# Patient Record
Sex: FEMALE | Race: WHITE | NOT HISPANIC OR LATINO | Employment: FULL TIME | URBAN - METROPOLITAN AREA
[De-identification: names, ages, dates, MRNs, and addresses within clinical notes are randomized per-mention and may not be internally consistent; named-entity substitution may affect disease eponyms.]

---

## 2018-01-30 ENCOUNTER — OFFICE VISIT CONVERTED (OUTPATIENT)
Dept: INTERNAL MEDICINE | Facility: CLINIC | Age: 38
End: 2018-01-30
Attending: INTERNAL MEDICINE

## 2018-03-22 ENCOUNTER — OFFICE VISIT CONVERTED (OUTPATIENT)
Dept: PLASTIC SURGERY | Facility: CLINIC | Age: 38
End: 2018-03-22
Attending: PLASTIC SURGERY

## 2018-03-22 ENCOUNTER — CONVERSION ENCOUNTER (OUTPATIENT)
Dept: OTHER | Facility: HOSPITAL | Age: 38
End: 2018-03-22

## 2018-06-04 ENCOUNTER — CONVERSION ENCOUNTER (OUTPATIENT)
Dept: INTERNAL MEDICINE | Facility: CLINIC | Age: 38
End: 2018-06-04

## 2018-06-04 ENCOUNTER — OFFICE VISIT CONVERTED (OUTPATIENT)
Dept: INTERNAL MEDICINE | Facility: CLINIC | Age: 38
End: 2018-06-04
Attending: INTERNAL MEDICINE

## 2018-07-17 ENCOUNTER — OFFICE VISIT CONVERTED (OUTPATIENT)
Dept: INTERNAL MEDICINE | Facility: CLINIC | Age: 38
End: 2018-07-17
Attending: INTERNAL MEDICINE

## 2018-09-13 ENCOUNTER — CONVERSION ENCOUNTER (OUTPATIENT)
Dept: INTERNAL MEDICINE | Facility: CLINIC | Age: 38
End: 2018-09-13

## 2018-09-13 ENCOUNTER — OFFICE VISIT CONVERTED (OUTPATIENT)
Dept: INTERNAL MEDICINE | Facility: CLINIC | Age: 38
End: 2018-09-13
Attending: INTERNAL MEDICINE

## 2018-12-19 ENCOUNTER — CONVERSION ENCOUNTER (OUTPATIENT)
Dept: MAMMOGRAPHY | Facility: HOSPITAL | Age: 38
End: 2018-12-19

## 2019-05-14 ENCOUNTER — OFFICE VISIT CONVERTED (OUTPATIENT)
Dept: INTERNAL MEDICINE | Facility: CLINIC | Age: 39
End: 2019-05-14
Attending: INTERNAL MEDICINE

## 2019-06-07 ENCOUNTER — HOSPITAL ENCOUNTER (OUTPATIENT)
Dept: LAB | Facility: HOSPITAL | Age: 39
Discharge: HOME | End: 2019-06-07

## 2019-06-07 LAB
ALBUMIN SERPL-MCNC: 4.3 G/DL (ref 3.5–5)
ALBUMIN/GLOB SERPL: 1.4 {RATIO} (ref 1.4–2.6)
ALP SERPL-CCNC: 89 U/L (ref 42–98)
ALT SERPL-CCNC: 8 U/L (ref 10–40)
ANION GAP SERPL CALC-SCNC: 18 MMOL/L (ref 8–19)
AST SERPL-CCNC: 19 U/L (ref 15–50)
BASOPHILS # BLD AUTO: 0.04 10*3/UL (ref 0–0.2)
BASOPHILS NFR BLD AUTO: 0.9 % (ref 0–3)
BILIRUB SERPL-MCNC: 0.6 MG/DL (ref 0.2–1.3)
BUN SERPL-MCNC: 9 MG/DL (ref 5–25)
BUN/CREAT SERPL: 13 {RATIO} (ref 6–20)
CALCIUM SERPL-MCNC: 9 MG/DL (ref 8.7–10.4)
CHLORIDE SERPL-SCNC: 103 MMOL/L (ref 99–111)
CHOLEST SERPL-MCNC: 165 MG/DL (ref 107–200)
CHOLEST/HDLC SERPL: 2.3 {RATIO} (ref 3–6)
CONV ABS IMM GRAN: 0.01 10*3/UL (ref 0–0.2)
CONV CO2: 24 MMOL/L (ref 22–32)
CONV IMMATURE GRAN: 0.2 % (ref 0–1.8)
CONV TOTAL PROTEIN: 7.4 G/DL (ref 6.3–8.2)
CREAT UR-MCNC: 0.71 MG/DL (ref 0.5–0.9)
DEPRECATED RDW RBC AUTO: 43.5 FL (ref 36.4–46.3)
EOSINOPHIL # BLD AUTO: 0.05 10*3/UL (ref 0–0.7)
EOSINOPHIL # BLD AUTO: 1.1 % (ref 0–7)
ERYTHROCYTE [DISTWIDTH] IN BLOOD BY AUTOMATED COUNT: 12.8 % (ref 11.7–14.4)
GFR SERPLBLD BASED ON 1.73 SQ M-ARVRAT: >60 ML/MIN/{1.73_M2}
GLOBULIN UR ELPH-MCNC: 3.1 G/DL (ref 2–3.5)
GLUCOSE SERPL-MCNC: 82 MG/DL (ref 65–99)
HBA1C MFR BLD: 12.6 G/DL (ref 12–16)
HCT VFR BLD AUTO: 37.6 % (ref 37–47)
HDLC SERPL-MCNC: 72 MG/DL (ref 40–60)
LDLC SERPL CALC-MCNC: 79 MG/DL (ref 70–100)
LYMPHOCYTES # BLD AUTO: 1.56 10*3/UL (ref 1–5)
MCH RBC QN AUTO: 31 PG (ref 27–31)
MCHC RBC AUTO-ENTMCNC: 33.5 G/DL (ref 33–37)
MCV RBC AUTO: 92.6 FL (ref 81–99)
MONOCYTES # BLD AUTO: 0.3 10*3/UL (ref 0.2–1.2)
MONOCYTES NFR BLD AUTO: 6.5 % (ref 3–10)
NEUTROPHILS # BLD AUTO: 2.63 10*3/UL (ref 2–8)
NEUTROPHILS NFR BLD AUTO: 57.3 % (ref 30–85)
NRBC CBCN: 0 % (ref 0–0.7)
OSMOLALITY SERPL CALC.SUM OF ELEC: 290 MOSM/KG (ref 273–304)
PLATELET # BLD AUTO: 239 10*3/UL (ref 130–400)
PMV BLD AUTO: 10.7 FL (ref 9.4–12.3)
POTASSIUM SERPL-SCNC: 3.9 MMOL/L (ref 3.5–5.3)
RBC # BLD AUTO: 4.06 10*6/UL (ref 4.2–5.4)
SODIUM SERPL-SCNC: 141 MMOL/L (ref 135–147)
TRIGL SERPL-MCNC: 68 MG/DL (ref 40–150)
TSH SERPL-ACNC: 1.5 M[IU]/L (ref 0.27–4.2)
VARIANT LYMPHS NFR BLD MANUAL: 34 % (ref 20–45)
VLDLC SERPL-MCNC: 14 MG/DL (ref 5–37)
WBC # BLD AUTO: 4.59 10*3/UL (ref 4.8–10.8)

## 2019-11-21 ENCOUNTER — OFFICE VISIT CONVERTED (OUTPATIENT)
Dept: INTERNAL MEDICINE | Facility: CLINIC | Age: 39
End: 2019-11-21
Attending: INTERNAL MEDICINE

## 2020-01-16 ENCOUNTER — HOSPITAL ENCOUNTER (OUTPATIENT)
Dept: MAMMOGRAPHY | Facility: HOSPITAL | Age: 40
Discharge: HOME | End: 2020-01-16
Attending: INTERNAL MEDICINE

## 2020-05-21 ENCOUNTER — TELEMEDICINE CONVERTED (OUTPATIENT)
Dept: INTERNAL MEDICINE | Facility: CLINIC | Age: 40
End: 2020-05-21
Attending: INTERNAL MEDICINE

## 2020-06-18 ENCOUNTER — HOSPITAL ENCOUNTER (OUTPATIENT)
Dept: LAB | Facility: HOSPITAL | Age: 40
Discharge: HOME | End: 2020-06-18

## 2020-06-18 LAB
ALBUMIN SERPL-MCNC: 4.4 G/DL (ref 3.5–5)
ALBUMIN/GLOB SERPL: 1.5 {RATIO} (ref 1.4–2.6)
ALP SERPL-CCNC: 79 U/L (ref 42–98)
ALT SERPL-CCNC: 6 U/L (ref 10–40)
ANION GAP SERPL CALC-SCNC: 16 MMOL/L (ref 8–19)
AST SERPL-CCNC: 18 U/L (ref 15–50)
BASOPHILS # BLD AUTO: 0.04 10*3/UL (ref 0–0.2)
BASOPHILS NFR BLD AUTO: 0.8 % (ref 0–3)
BILIRUB SERPL-MCNC: 0.55 MG/DL (ref 0.2–1.3)
BUN SERPL-MCNC: 13 MG/DL (ref 5–25)
BUN/CREAT SERPL: 21 {RATIO} (ref 6–20)
CALCIUM SERPL-MCNC: 9.2 MG/DL (ref 8.7–10.4)
CHLORIDE SERPL-SCNC: 99 MMOL/L (ref 99–111)
CHOLEST SERPL-MCNC: 142 MG/DL (ref 107–200)
CHOLEST/HDLC SERPL: 2.3 {RATIO} (ref 3–6)
CONV ABS IMM GRAN: 0.01 10*3/UL (ref 0–0.2)
CONV CO2: 23 MMOL/L (ref 22–32)
CONV IMMATURE GRAN: 0.2 % (ref 0–1.8)
CONV TOTAL PROTEIN: 7.3 G/DL (ref 6.3–8.2)
CREAT UR-MCNC: 0.63 MG/DL (ref 0.5–0.9)
DEPRECATED RDW RBC AUTO: 43.4 FL (ref 36.4–46.3)
EOSINOPHIL # BLD AUTO: 0.08 10*3/UL (ref 0–0.7)
EOSINOPHIL # BLD AUTO: 1.7 % (ref 0–7)
ERYTHROCYTE [DISTWIDTH] IN BLOOD BY AUTOMATED COUNT: 12.7 % (ref 11.7–14.4)
GFR SERPLBLD BASED ON 1.73 SQ M-ARVRAT: >60 ML/MIN/{1.73_M2}
GLOBULIN UR ELPH-MCNC: 2.9 G/DL (ref 2–3.5)
GLUCOSE SERPL-MCNC: 83 MG/DL (ref 65–99)
HCT VFR BLD AUTO: 39.1 % (ref 37–47)
HDLC SERPL-MCNC: 61 MG/DL (ref 40–60)
HGB BLD-MCNC: 13 G/DL (ref 12–16)
LDLC SERPL CALC-MCNC: 68 MG/DL (ref 70–100)
LYMPHOCYTES # BLD AUTO: 1.59 10*3/UL (ref 1–5)
LYMPHOCYTES NFR BLD AUTO: 32.9 % (ref 20–45)
MCH RBC QN AUTO: 31 PG (ref 27–31)
MCHC RBC AUTO-ENTMCNC: 33.2 G/DL (ref 33–37)
MCV RBC AUTO: 93.1 FL (ref 81–99)
MONOCYTES # BLD AUTO: 0.32 10*3/UL (ref 0.2–1.2)
MONOCYTES NFR BLD AUTO: 6.6 % (ref 3–10)
NEUTROPHILS # BLD AUTO: 2.8 10*3/UL (ref 2–8)
NEUTROPHILS NFR BLD AUTO: 57.8 % (ref 30–85)
NRBC CBCN: 0 % (ref 0–0.7)
OSMOLALITY SERPL CALC.SUM OF ELEC: 277 MOSM/KG (ref 273–304)
PLATELET # BLD AUTO: 257 10*3/UL (ref 130–400)
PMV BLD AUTO: 10.3 FL (ref 9.4–12.3)
POTASSIUM SERPL-SCNC: 4.2 MMOL/L (ref 3.5–5.3)
RBC # BLD AUTO: 4.2 10*6/UL (ref 4.2–5.4)
SODIUM SERPL-SCNC: 134 MMOL/L (ref 135–147)
TRIGL SERPL-MCNC: 66 MG/DL (ref 40–150)
TSH SERPL-ACNC: 1 M[IU]/L (ref 0.27–4.2)
VLDLC SERPL-MCNC: 13 MG/DL (ref 5–37)
WBC # BLD AUTO: 4.84 10*3/UL (ref 4.8–10.8)

## 2020-07-02 ENCOUNTER — TELEMEDICINE CONVERTED (OUTPATIENT)
Dept: INTERNAL MEDICINE | Facility: CLINIC | Age: 40
End: 2020-07-02
Attending: NURSE PRACTITIONER

## 2020-08-03 ENCOUNTER — HOSPITAL ENCOUNTER (OUTPATIENT)
Dept: GENERAL RADIOLOGY | Facility: HOSPITAL | Age: 40
Discharge: HOME | End: 2020-08-03
Attending: INTERNAL MEDICINE

## 2020-09-28 ENCOUNTER — TELEMEDICINE CONVERTED (OUTPATIENT)
Dept: INTERNAL MEDICINE | Facility: CLINIC | Age: 40
End: 2020-09-28
Attending: INTERNAL MEDICINE

## 2020-12-30 ENCOUNTER — HOSPITAL ENCOUNTER (OUTPATIENT)
Dept: OTHER | Facility: HOSPITAL | Age: 40
Discharge: HOME | End: 2020-12-30
Attending: INTERNAL MEDICINE

## 2021-01-29 ENCOUNTER — HOSPITAL ENCOUNTER (OUTPATIENT)
Dept: OTHER | Facility: HOSPITAL | Age: 41
Discharge: HOME | End: 2021-01-29
Attending: INTERNAL MEDICINE

## 2021-05-03 ENCOUNTER — HOSPITAL ENCOUNTER (OUTPATIENT)
Dept: MAMMOGRAPHY | Facility: HOSPITAL | Age: 41
Discharge: HOME | End: 2021-05-03
Attending: INTERNAL MEDICINE

## 2021-05-13 NOTE — PROGRESS NOTES
"   Progress Note      Patient Name: Violeta Gaona   Patient ID: [id number]   Sex: Female   YOB: 1980    Primary Care Provider: Radha Finnegan MD    Visit Date: May 21, 2020    Provider: Radha Finnegan MD   Location: [facility]   Location Address: [address] [place] [address], [address]  [place], [place]  [id number]   Location Phone: [phone]          Chief Complaint  · \"6 month follow up\"      History Of Present Illness  Video Conferencing Visit  Violeta Gaona is a 39 year old /White female who is presenting for evaluation via video conferencing via zoom. Verbal consent obtained before beginning visit.   The following staff were present during this visit: none   Violeta Gaona is a 39 year old /White female who presents for evaluation and treatment of:      chronic issues    ramirez estevan other than COVID    feels like anxiety meds are significantly helping her     no chest pain  no trouble breathing  no leg swelling    she is curious if she could try a medication to help with vaginal dryness  she has some dysparunia       Past Medical History  Disease Name Date Onset Notes   Artificial menopause state 12/16/2014 --    Broken Bones 1997 --    Hemorrhoids --  --    Night sweats --  --    Reflux Disease --  --    Screening Mammogram 01/16/19 --          Past Surgical History  Procedure Name Date Notes   Exploratory Laprotomy 2004 Adhesions   Hysterectomy 2004 PCDD (Poly-cystic Overies)         Medication List  Name Date Started Instructions   Premarin 0.9 mg oral tablet 05/21/2020 take 1 tablet (0.9 mg) by oral route once daily   Wellbutrin  mg oral tablet sustained-release 12 hr 05/21/2020 take 1 tablet (150 mg) by oral route once daily         Allergy List  Allergen Name Date Reaction Notes   Lexapro 7/2018 Hives --        Allergies Reconciled  Family Medical History  Disease Name Relative/Age Notes   Heart Disease Grandfather (maternal)/  Grandmother (maternal)/   --  "   Diabetes Grandfather (maternal)/  Grandmother (maternal)/   --          Reproductive History  Menstrual   Certainty of LMP Date:  Menopause Status: Postmenopausal Age Menopause: 24   Pregnancy Summary   Total Pregnancies: 0 Full Term: 0 Premature: 0   Ab Induced: 0 Ab Spontaneous: 0 Ectopics: 0   Multiples: 0 Livin         Social History  Finding Status Start/Stop Quantity Notes   Alcohol Never --/-- --  --    Tobacco Never --/-- --  --          Immunizations  NameDate Admin Mfg Trade Name Lot Number Route Inj VIS Given VIS Publication   Hepatitis A02018 SKB HAVRIX-ADULT [id number] IM LD 2018   Comments: Pt tolerated well, left office in stable condition   Eoicxxpyv13/13/2018 PMC Fluzone Quadrivalent [id number] IM LA 2018   Comments: Pt tolerated well and left office in stable condition   Tdap2018 SKB BOOSTRIX 5H2H2 IM LD 2018   Comments: pt tolerated well and left office in stable condition, CHEPE Iqbal         Review of Systems  · Constitutional  o Denies  o : fever, fatigue, weight loss, weight gain  · Cardiovascular  o Denies  o : lower extremity edema, claudication, chest pressure, palpitations  · Respiratory  o Denies  o : shortness of breath, wheezing, frequent cough, hemoptysis, dyspnea on exertion  · Gastrointestinal  o Denies  o : nausea, vomiting, diarrhea, constipation, abdominal pain      Physical Examination                Assessment  · Artificial menopause state     627.4/N95.8  will adjust medications  will refer back to gyn for further adjust meds of meds  · Post menopausal syndrome     V49.81/Z78.0  · Anxiety disorder     300.00/F41.9  doing great cont current meds  · Vitamin D deficiency     268.9/E55.9    Problems Reconciled  Plan  · Orders  o DEXA Bone Density, 1 or more sites, axial skeleton HMH (30867) - V49.81/Z78.0 - 2020  o Vitamin D Level (35986) - 268.9/E55.9 - 2020  o ACO-39: Current medications updated and  reviewed () - - 05/21/2020  o OB/GYN CONSULTATION (OBGYN) - 627.4/N95.8, V49.81/Z78.0 - 05/21/2020   to help with post hysterectomy med management; has seen preen in the past  · Medications  o Premarin 0.625 mg/gram vaginal cream   SIG: insert one-fourth applicatorful (0.5 gram) by vaginal route twice weekly   DISP: (1) 30 gm tube/kit with 0 refills  Prescribed on 05/21/2020     o Premarin 0.9 mg oral tablet   SIG: take 1 tablet (0.9 mg) by oral route once daily   DISP: (90) Tablet with 0 refills  Refilled on 05/21/2020     o Wellbutrin  mg oral tablet sustained-release 12 hr   SIG: take 1 tablet (150 mg) by oral route once daily   DISP: (90) Tablet with 0 refills  Refilled on 05/21/2020     o Medications have been Reconciled  o Transition of Care or Provider Policy  · Instructions  o Stop taking calcium supplements for at least 48 hours prior to your exam. Failure to stop supplements could alter results, and the radiologists will require you to reschedule your test.  o Patient was educated/instructed on their diagnosis, treatment and medications prior to discharge from the clinic today.            Electronically Signed by: Radha Finnegan MD -Author on May 25, 2020 05:50:07 PM

## 2021-05-13 NOTE — PROGRESS NOTES
Progress Note      Patient Name: Violeta Gaona   Patient ID: [id number]   Sex: Female   YOB: 1980    Primary Care Provider: Radha Finnegan MD    Visit Date: July 2, 2020    Provider: TIFFANY Seaman   Location: [facility]   Location Address: [address] [place] [address], [address]  [place], [place]  [id number]   Location Phone: [phone]          Chief Complaint  · Possible shingles   · Rash       History Of Present Illness  Video Conferencing Visit  Violeta Gaona is a 40 year old /White female who is presenting for evaluation via video conferencing via Zoom. Verbal consent obtained before beginning visit.   The following staff were present during this visit: Debbi Baptiste MA; TIFFANY Seaman      Informed patient that as visit is being performed as a video conference there will be no opportunity to obtain vital signs or perform a thorough physical exam. Due to this there is unfortunately a possibility that things may be missed that would typically be noticed during a traditional visit. Patient is aware of this possibility and agrees to proceed with the video conference. Patient states there is no other person present for this video conference. Call via Zoom.    Patient reports last night she started to feel a tingling sensation on her left side, this morning when she woke up the tingly feeling has been replaced by a severe burning/tingling pain and raised erythematous rash. Patient states site it very tender to touch. Denies fever, chills, warmth, streaking, itching. Patient reports chickenpox at the age of 4. She has not yet taken anything for pain. Patient is not immunocompromised.       Past Medical History  Disease Name Date Onset Notes   Artificial menopause state 12/16/2014 --    Broken Bones 1997 --    Hemorrhoids --  --    Night sweats --  --    Reflux Disease --  --    Screening Mammogram 01/16/19 --          Past Surgical History  Procedure Name Date Notes    Exploratory Laprotomy  Adhesions   Hysterectomy  PCDD (Poly-cystic Overies)         Medication List  Name Date Started Instructions   Premarin 0.625 mg/gram vaginal cream 2020 insert one-fourth applicatorful (0.5 gram) by vaginal route twice weekly   Premarin 0.9 mg oral tablet 2020 take 1 tablet (0.9 mg) by oral route once daily   Wellbutrin  mg oral tablet sustained-release 12 hr 2020 take 1 tablet (150 mg) by oral route once daily         Allergy List  Allergen Name Date Reaction Notes   Lexapro 2018 Hives --        Allergies Reconciled  Family Medical History  Disease Name Relative/Age Notes   Heart Disease Grandfather (maternal)/  Grandmother (maternal)/   --    Diabetes Grandfather (maternal)/  Grandmother (maternal)/   --          Reproductive History  Menstrual   Certainty of LMP Date:  Menopause Status: Postmenopausal Age Menopause: 24   Pregnancy Summary   Total Pregnancies: 0 Full Term: 0 Premature: 0   Ab Induced: 0 Ab Spontaneous: 0 Ectopics: 0   Multiples: 0 Livin         Social History  Finding Status Start/Stop Quantity Notes   Alcohol Never --/-- --  --    Tobacco Never --/-- --  --          Immunizations  NameDate Admin Mfg Trade Name Lot Number Route Inj VIS Given VIS Publication   Hepatitis A02018 SKB HAVRIX-ADULT [id number] IM LD 2018   Comments: Pt tolerated well, left office in stable condition   Jgvktwqzq06/13/2018 [facility] Fluzone Quadrivalent [id number] IM LA 2018   Comments: Pt tolerated well and left office in stable condition   Tdap2018 SKB BOOSTRIX 5H2H2 IM LD 2018   Comments: pt tolerated well and left office in stable condition, CHEPE Iqbal         Review of Systems  · Constitutional  o Denies  o : fever, fatigue, weight loss, weight gain  · Cardiovascular  o Denies  o : lower extremity edema, chest pressure, palpitations  · Respiratory  o Denies  o : shortness of breath, wheezing, cough,  dyspnea on exertion  · Gastrointestinal  o Denies  o : nausea, vomiting, diarrhea, constipation, abdominal pain  · Integument  o Admits  o : rash, new skin lesions  o Denies  o : itching      Physical Examination     General: Well nourished, no acute distress  HENT: Atraumatic, normocephalic  Eyes: Extraocular movements intact, no scleral icterus  Lungs: Breathing comfortably, without cough  Integumentary: Raised, erythematous rash  Neurologic: Grossly oriented to person, place, time; without facial droop  Psych: Normal mood and affect               Assessment  · Herpes zoster     053.9/B02.9  Left flank, symptoms less than 72 hours. Will treat with valacyclovir at this time. Tylenol/Motrin for pain relief. Avoid exposure to immunocompromised patients, those who have not had the chickenpox or chickenpox vaccine, including infants less than 12 months. Discussed course of illness/rash, monitor for secondary bacterial infection. Keep area clean and dry. Patient will call or return to clinic with concerns, aware of 24 hour on call service in the event that there are concerns outside of office hours.    Problems Reconciled  Plan  · Orders  o ACO-39: Current medications updated and reviewed () - - 07/02/2020  · Medications  o valacyclovir 1 gram oral tablet   SIG: take 1 tablet (1,000 mg) by oral route 3 times per day for 7 days   DISP: (21) tablets with 0 refills  Prescribed on 07/02/2020     o Medications have been Reconciled  o Transition of Care or Provider Policy  · Instructions  o Take all medications as prescribed/directed.  o Patient was educated/instructed on their diagnosis, treatment and medications prior to discharge from the clinic today.  o Patient instructed to seek medical attention urgently for new or worsening symptoms.  o Call the office with any concerns or questions.  · Disposition  o Call or Return if symptoms worsen or persist.  o Prescriptions sent to pharmacy            Electronically Signed  by: TIFFANY Seaman -Author on July 2, 2020 02:26:46 PM

## 2021-05-13 NOTE — PROGRESS NOTES
"   Progress Note      Patient Name: Violeta Gaona   Patient ID: [id number]   Sex: Female   YOB: 1980    Primary Care Provider: Radha Finnegan MD    Visit Date: September 28, 2020    Provider: Radha Finnegan MD   Location: [facility]   Location Address: [address] [place] [address], [address]  [place], [place]  [id number]   Location Phone: [phone]          Chief Complaint  · follow up  · \"follow from Dr. Lewis\"      History Of Present Illness  Video Conferencing Visit  Violeta Gaona is a 40 year old /White female who is presenting for evaluation via video conferencing via zoom. Verbal consent obtained before beginning visit.   The following staff were present during this visit: none   Violeta Gaona is a 40 year old /White female who presents for evaluation and treatment of:      Issues.    Overall doing well    Stress medicines working significantly well for her  Feels like things are much better than they had been previously  Now in a different job which is much better for her    No chest pain  No trouble breathing  No leg swelling    Vagifem working well for her       Past Medical History  Disease Name Date Onset Notes   Artificial menopause state 12/16/2014 --    Broken Bones 1997 --    Hemorrhoids --  --    Night sweats --  --    Reflux Disease --  --    Screening Mammogram 01/16/19 --          Past Surgical History  Procedure Name Date Notes   Exploratory Laprotomy 2004 Adhesions   Hysterectomy 2004 PCDD (Poly-cystic Overies)         Medication List  Name Date Started Instructions   Calcium 600 600 mg calcium (1,500 mg) oral tablet  take 2 tablets by oral route daily   Premarin 0.625 mg/gram vaginal cream 05/21/2020 insert one-fourth applicatorful (0.5 gram) by vaginal route twice weekly   Vagifem 10 mcg vaginal tablet  insert 1 tablet (10 mcg) by vaginal route twice weekly   Wellbutrin  mg oral tablet sustained-release 12 hr 07/21/2020 Take 1 tablet by mouth " once daily         Allergy List  Allergen Name Date Reaction Notes   Lexapro 2018 Hives --          Family Medical History  Disease Name Relative/Age Notes   Heart Disease Grandfather (maternal)/  Grandmother (maternal)/   --    Diabetes Grandfather (maternal)/  Grandmother (maternal)/   --          Reproductive History  Menstrual   Certainty of LMP Date:  Menopause Status: Postmenopausal Age Menopause: 24   Pregnancy Summary   Total Pregnancies: 0 Full Term: 0 Premature: 0   Ab Induced: 0 Ab Spontaneous: 0 Ectopics: 0   Multiples: 0 Livin         Social History  Finding Status Start/Stop Quantity Notes   Alcohol Never --/-- --  --    Tobacco Never --/-- --  --          Immunizations  NameDate Admin Mfg Trade Name Lot Number Route Inj VIS Given VIS Publication   Hepatitis A02018 SKB HAVRIX-ADULT [id number] IM LD 2018   Comments: Pt tolerated well, left office in stable condition   Zmhnlrshy28/13/2018 PMC Fluzone Quadrivalent [id number] IM LA 2018   Comments: Pt tolerated well and left office in stable condition   Tdap2018 SKB BOOSTRIX 5H2H2 IM LD 2018   Comments: pt tolerated well and left office in stable condition, CHEPE Iqbal         Review of Systems  · Constitutional  o Denies  o : fever, fatigue, weight loss, weight gain  · Cardiovascular  o Denies  o : lower extremity edema, claudication, chest pressure, palpitations  · Respiratory  o Denies  o : shortness of breath, wheezing, frequent cough, hemoptysis, dyspnea on exertion  · Gastrointestinal  o Denies  o : nausea, vomiting, diarrhea, constipation, abdominal pain      Physical Examination     Gen: well-nourished, no acute distress  HENT: atraumatic, normocephalic  Eyes: extraocular movements intact, no scleral icterus  Lung: breathing comfortably, no cough  Skin: no visible rash, no lesions  Neuro: grossly oriented to person, place, and time. no facial droop   Psych: normal mood and affect              Assessment  · Artificial menopause state     627.4/N95.8  Well on Vagifem continue for now  · Anxiety disorder     300.00/F41.9  Well-controlled continue current medications    Problems Reconciled  Plan  · Orders  o ACO-39: Current medications updated and reviewed (1159F, ) - - 09/28/2020  · Medications  o Medications have been Reconciled  o Transition of Care or Provider Policy  · Instructions  o Patient was educated/instructed on their diagnosis, treatment and medications prior to discharge from the clinic today.            Electronically Signed by: Radha Finnegan MD -Author on October 11, 2020 10:37:44 PM

## 2021-05-15 VITALS
TEMPERATURE: 98.3 F | WEIGHT: 133.12 LBS | HEART RATE: 75 BPM | DIASTOLIC BLOOD PRESSURE: 68 MMHG | HEIGHT: 65 IN | BODY MASS INDEX: 22.18 KG/M2 | SYSTOLIC BLOOD PRESSURE: 102 MMHG | OXYGEN SATURATION: 99 %

## 2021-05-15 VITALS
HEART RATE: 86 BPM | HEIGHT: 65 IN | DIASTOLIC BLOOD PRESSURE: 78 MMHG | BODY MASS INDEX: 22.82 KG/M2 | OXYGEN SATURATION: 99 % | RESPIRATION RATE: 16 BRPM | TEMPERATURE: 97.9 F | WEIGHT: 137 LBS | SYSTOLIC BLOOD PRESSURE: 122 MMHG

## 2021-05-16 VITALS
OXYGEN SATURATION: 99 % | RESPIRATION RATE: 16 BRPM | WEIGHT: 138.12 LBS | DIASTOLIC BLOOD PRESSURE: 70 MMHG | HEIGHT: 65 IN | TEMPERATURE: 98.1 F | HEART RATE: 69 BPM | BODY MASS INDEX: 23.01 KG/M2 | SYSTOLIC BLOOD PRESSURE: 120 MMHG

## 2021-05-16 VITALS
SYSTOLIC BLOOD PRESSURE: 120 MMHG | WEIGHT: 138.25 LBS | OXYGEN SATURATION: 100 % | RESPIRATION RATE: 16 BRPM | HEART RATE: 88 BPM | HEIGHT: 65 IN | BODY MASS INDEX: 23.03 KG/M2 | DIASTOLIC BLOOD PRESSURE: 80 MMHG

## 2021-05-16 VITALS
HEART RATE: 76 BPM | BODY MASS INDEX: 22.85 KG/M2 | SYSTOLIC BLOOD PRESSURE: 104 MMHG | TEMPERATURE: 97.3 F | WEIGHT: 137.12 LBS | RESPIRATION RATE: 16 BRPM | OXYGEN SATURATION: 99 % | DIASTOLIC BLOOD PRESSURE: 80 MMHG | HEIGHT: 65 IN

## 2021-05-16 VITALS
HEART RATE: 70 BPM | HEIGHT: 65 IN | WEIGHT: 136.37 LBS | BODY MASS INDEX: 22.72 KG/M2 | RESPIRATION RATE: 16 BRPM | OXYGEN SATURATION: 100 % | TEMPERATURE: 97 F | SYSTOLIC BLOOD PRESSURE: 122 MMHG | DIASTOLIC BLOOD PRESSURE: 82 MMHG

## 2021-06-08 VITALS
HEART RATE: 69 BPM | OXYGEN SATURATION: 99 % | SYSTOLIC BLOOD PRESSURE: 116 MMHG | HEIGHT: 65 IN | DIASTOLIC BLOOD PRESSURE: 80 MMHG | WEIGHT: 138 LBS | BODY MASS INDEX: 22.99 KG/M2

## 2021-06-21 ENCOUNTER — TELEMEDICINE (OUTPATIENT)
Dept: INTERNAL MEDICINE | Facility: CLINIC | Age: 41
End: 2021-06-21

## 2021-06-21 DIAGNOSIS — F33.1 MAJOR DEPRESSIVE DISORDER, RECURRENT, MODERATE (HCC): ICD-10-CM

## 2021-06-21 DIAGNOSIS — F41.9 ANXIETY: Primary | ICD-10-CM

## 2021-06-21 PROBLEM — K21.9 ESOPHAGEAL REFLUX: Status: ACTIVE | Noted: 2021-06-21

## 2021-06-21 PROCEDURE — 99214 OFFICE O/P EST MOD 30 MIN: CPT | Performed by: INTERNAL MEDICINE

## 2021-06-21 RX ORDER — BUPROPION HYDROCHLORIDE 100 MG/1
TABLET, EXTENDED RELEASE ORAL
COMMUNITY
End: 2021-06-21 | Stop reason: SDUPTHER

## 2021-06-21 RX ORDER — ESTRADIOL 10 UG/1
INSERT VAGINAL
COMMUNITY
End: 2022-01-07 | Stop reason: SDUPTHER

## 2021-06-21 RX ORDER — BUPROPION HYDROCHLORIDE 150 MG/1
150 TABLET, EXTENDED RELEASE ORAL DAILY
Qty: 90 TABLET | Refills: 0 | Status: SHIPPED | OUTPATIENT
Start: 2021-06-21 | End: 2021-06-21 | Stop reason: SDUPTHER

## 2021-06-21 RX ORDER — CONJUGATED ESTROGENS 0.62 MG/1
TABLET, FILM COATED ORAL
COMMUNITY
Start: 2021-05-20 | End: 2021-08-31

## 2021-06-21 RX ORDER — BUPROPION HYDROCHLORIDE 150 MG/1
150 TABLET, EXTENDED RELEASE ORAL DAILY
Qty: 90 TABLET | Refills: 0 | Status: SHIPPED | OUTPATIENT
Start: 2021-06-21 | End: 2021-09-01 | Stop reason: SDUPTHER

## 2021-06-21 NOTE — PROGRESS NOTES
Chief Complaint  Anxiety    Subjective          Violeta Gaona presents to [facility]  History of Present Illness  Understands this is a video telehealth visit and has given consent to treatment of this type of visit, knowing she may need to come in for further evaluation      Unfortunately just lost her brother  anxiety and depression are worsening due to dealing with this    No chest pain  No trouble breathing      Objective   Vital Signs:   There were no vitals taken for this visit.    Physical Exam   Visit done by teleheatlh  Gen: well appearing and alert  Eye: EOMI, PEERL  CV: non diaphoretic  Psych: does appear anxious and sad   Result Review :          Procedures      Assessment and Plan    Diagnoses and all orders for this visit:    1. Anxiety (Primary)    2. Major depressive disorder, recurrent, moderate (CMS/HCC)  Comments:  will increase meds  discussed risks and benefits    Other orders  -     Discontinue: buPROPion SR (Wellbutrin SR) 150 MG 12 hr tablet; Take 1 tablet by mouth Daily.  Dispense: 90 tablet; Refill: 0  -     buPROPion SR (Wellbutrin SR) 150 MG 12 hr tablet; Take 1 tablet by mouth Daily.  Dispense: 90 tablet; Refill: 0        Follow Up   No follow-ups on file.  Patient was given instructions and counseling regarding her condition or for health maintenance advice. Please see specific information pulled into the AVS if appropriate.

## 2021-07-09 ENCOUNTER — LAB (OUTPATIENT)
Dept: LAB | Facility: HOSPITAL | Age: 41
End: 2021-07-09

## 2021-07-09 ENCOUNTER — TRANSCRIBE ORDERS (OUTPATIENT)
Dept: LAB | Facility: HOSPITAL | Age: 41
End: 2021-07-09

## 2021-07-09 DIAGNOSIS — Z13.9 ENCOUNTER FOR HEALTH-RELATED SCREENING: ICD-10-CM

## 2021-07-09 DIAGNOSIS — Z13.9 ENCOUNTER FOR HEALTH-RELATED SCREENING: Primary | ICD-10-CM

## 2021-07-09 LAB
ALBUMIN SERPL-MCNC: 4.5 G/DL (ref 3.5–5.2)
ALBUMIN/GLOB SERPL: 1.8 G/DL
ALP SERPL-CCNC: 85 U/L (ref 39–117)
ALT SERPL W P-5'-P-CCNC: 8 U/L (ref 1–33)
ANION GAP SERPL CALCULATED.3IONS-SCNC: 6.7 MMOL/L (ref 5–15)
AST SERPL-CCNC: 15 U/L (ref 1–32)
BASOPHILS # BLD AUTO: 0.05 10*3/MM3 (ref 0–0.2)
BASOPHILS NFR BLD AUTO: 1.2 % (ref 0–1.5)
BILIRUB SERPL-MCNC: 0.6 MG/DL (ref 0–1.2)
BUN SERPL-MCNC: 11 MG/DL (ref 6–20)
BUN/CREAT SERPL: 11.6 (ref 7–25)
CALCIUM SPEC-SCNC: 8.8 MG/DL (ref 8.6–10.5)
CHLORIDE SERPL-SCNC: 104 MMOL/L (ref 98–107)
CHOLEST SERPL-MCNC: 150 MG/DL (ref 0–200)
CO2 SERPL-SCNC: 27.3 MMOL/L (ref 22–29)
CREAT SERPL-MCNC: 0.95 MG/DL (ref 0.57–1)
DEPRECATED RDW RBC AUTO: 44 FL (ref 37–54)
EOSINOPHIL # BLD AUTO: 0.07 10*3/MM3 (ref 0–0.4)
EOSINOPHIL NFR BLD AUTO: 1.7 % (ref 0.3–6.2)
ERYTHROCYTE [DISTWIDTH] IN BLOOD BY AUTOMATED COUNT: 12.7 % (ref 12.3–15.4)
GFR SERPL CREATININE-BSD FRML MDRD: 64 ML/MIN/1.73
GLOBULIN UR ELPH-MCNC: 2.5 GM/DL
GLUCOSE SERPL-MCNC: 91 MG/DL (ref 65–99)
HBA1C MFR BLD: 5.2 % (ref 4.8–5.6)
HCT VFR BLD AUTO: 40.4 % (ref 34–46.6)
HDLC SERPL-MCNC: 62 MG/DL (ref 40–60)
HGB BLD-MCNC: 13.4 G/DL (ref 12–15.9)
IMM GRANULOCYTES # BLD AUTO: 0.01 10*3/MM3 (ref 0–0.05)
IMM GRANULOCYTES NFR BLD AUTO: 0.2 % (ref 0–0.5)
LDLC SERPL CALC-MCNC: 74 MG/DL (ref 0–100)
LDLC/HDLC SERPL: 1.2 {RATIO}
LYMPHOCYTES # BLD AUTO: 1.52 10*3/MM3 (ref 0.7–3.1)
LYMPHOCYTES NFR BLD AUTO: 36.9 % (ref 19.6–45.3)
MCH RBC QN AUTO: 31.2 PG (ref 26.6–33)
MCHC RBC AUTO-ENTMCNC: 33.2 G/DL (ref 31.5–35.7)
MCV RBC AUTO: 94 FL (ref 79–97)
MONOCYTES # BLD AUTO: 0.24 10*3/MM3 (ref 0.1–0.9)
MONOCYTES NFR BLD AUTO: 5.8 % (ref 5–12)
NEUTROPHILS NFR BLD AUTO: 2.23 10*3/MM3 (ref 1.7–7)
NEUTROPHILS NFR BLD AUTO: 54.2 % (ref 42.7–76)
NRBC BLD AUTO-RTO: 0 /100 WBC (ref 0–0.2)
PLATELET # BLD AUTO: 246 10*3/MM3 (ref 140–450)
PMV BLD AUTO: 10.8 FL (ref 6–12)
POTASSIUM SERPL-SCNC: 4.1 MMOL/L (ref 3.5–5.2)
PROT SERPL-MCNC: 7 G/DL (ref 6–8.5)
RBC # BLD AUTO: 4.3 10*6/MM3 (ref 3.77–5.28)
SODIUM SERPL-SCNC: 138 MMOL/L (ref 136–145)
TRIGL SERPL-MCNC: 69 MG/DL (ref 0–150)
TSH SERPL DL<=0.05 MIU/L-ACNC: 1.13 UIU/ML (ref 0.27–4.2)
VLDLC SERPL-MCNC: 14 MG/DL (ref 5–40)
WBC # BLD AUTO: 4.12 10*3/MM3 (ref 3.4–10.8)

## 2021-07-09 PROCEDURE — 80061 LIPID PANEL: CPT

## 2021-07-09 PROCEDURE — 85025 COMPLETE CBC W/AUTO DIFF WBC: CPT

## 2021-07-09 PROCEDURE — 80053 COMPREHEN METABOLIC PANEL: CPT

## 2021-07-09 PROCEDURE — 84443 ASSAY THYROID STIM HORMONE: CPT

## 2021-07-09 PROCEDURE — 83036 HEMOGLOBIN GLYCOSYLATED A1C: CPT

## 2021-07-09 PROCEDURE — 36415 COLL VENOUS BLD VENIPUNCTURE: CPT

## 2021-08-31 ENCOUNTER — TELEMEDICINE (OUTPATIENT)
Dept: INTERNAL MEDICINE | Facility: CLINIC | Age: 41
End: 2021-08-31

## 2021-08-31 DIAGNOSIS — F33.0 MAJOR DEPRESSIVE DISORDER, RECURRENT, MILD (HCC): Primary | ICD-10-CM

## 2021-08-31 DIAGNOSIS — F41.9 ANXIETY: ICD-10-CM

## 2021-08-31 DIAGNOSIS — N95.8 ARTIFICIAL MENOPAUSE STATE: ICD-10-CM

## 2021-08-31 PROCEDURE — 99213 OFFICE O/P EST LOW 20 MIN: CPT | Performed by: INTERNAL MEDICINE

## 2021-08-31 NOTE — ASSESSMENT & PLAN NOTE
Noticing a little bit of change, will have her cont to work wih dr mcdonald, however the meds did help a bit

## 2021-08-31 NOTE — PROGRESS NOTES
Chief Complaint  No chief complaint on file.   Follow up for anxiety    Subjective          Violeta Gaona presents to [facility]  History of Present Illness  Patient understanding that this is a telehealth visit.  I cannot perform a full physical exam, therefore something might be missed, or patient may need to come into the office for further evaluation.  Patient is understanding and consents to this type of visit.  My Chart zoom    Feels like she is starting to sleep better  When she was on this previously she did notice some agitation    No chest pain  No palpitations  One dream    Still has stress, but handling it well    Saw Dr. Lewis and was told that she has vaginal atrophy and stress incontinence  She is doing meds and it has helped    Reviewed labs with her    Objective   Vital Signs:   There were no vitals taken for this visit.    Physical Exam   Result Review :     Gen: well-nourished, no acute distress  HENT: atraumatic, normocephalic  Eyes: extraocular movements intact, no scleral icterus  Lung: breathing comfortably, no cough  Skin: no visible rash, no lesions  Neuro: grossly oriented to person, place, and time. no facial droop   Psych: normal mood and affect    Common labs    Common Labsle 7/9/21 7/9/21 7/9/21 7/9/21    1105 1105 1105 1105   Glucose   91    BUN   11    Creatinine   0.95    eGFR Non African Am   64    Sodium   138    Potassium   4.1    Chloride   104    Calcium   8.8    Albumin   4.50    Total Bilirubin   0.6    Alkaline Phosphatase   85    AST (SGOT)   15    ALT (SGPT)   8    WBC 4.12      Hemoglobin 13.4      Hematocrit 40.4      Platelets 246      Total Cholesterol    150   Triglycerides    69   HDL Cholesterol    62 (A)   LDL Cholesterol     74   Hemoglobin A1C  5.20     (A) Abnormal value               Procedures      Assessment and Plan    Diagnoses and all orders for this visit:    1. Major depressive disorder, recurrent, mild  (CMS/[facility]) (Primary)  Assessment & Plan:  Doing great, cont wellbutrin      2. Anxiety    3. Artificial menopause state  Assessment & Plan:  Noticing a little bit of change, will have her cont to work wih dr cmdonald, however the meds did help a bit        Follow Up   Return in about 4 months (around 12/31/2021).  Patient was given instructions and counseling regarding her condition or for health maintenance advice. Please see specific information pulled into the AVS if appropriate.

## 2021-09-02 RX ORDER — BUPROPION HYDROCHLORIDE 150 MG/1
150 TABLET, EXTENDED RELEASE ORAL DAILY
Qty: 90 TABLET | Refills: 1 | Status: SHIPPED | OUTPATIENT
Start: 2021-09-02 | End: 2022-01-07

## 2021-09-07 RX ORDER — CONJUGATED ESTROGENS 0.62 MG/1
TABLET, FILM COATED ORAL
Qty: 90 TABLET | Refills: 1 | Status: SHIPPED | OUTPATIENT
Start: 2021-09-07 | End: 2022-01-07

## 2021-11-01 ENCOUNTER — OFFICE VISIT (OUTPATIENT)
Dept: OBSTETRICS AND GYNECOLOGY | Facility: CLINIC | Age: 41
End: 2021-11-01

## 2021-11-01 VITALS
DIASTOLIC BLOOD PRESSURE: 75 MMHG | BODY MASS INDEX: 21.05 KG/M2 | HEIGHT: 66 IN | WEIGHT: 131 LBS | SYSTOLIC BLOOD PRESSURE: 131 MMHG

## 2021-11-01 DIAGNOSIS — Z01.419 ENCOUNTER FOR GYNECOLOGICAL EXAMINATION WITHOUT ABNORMAL FINDING: Primary | ICD-10-CM

## 2021-11-01 PROCEDURE — 99396 PREV VISIT EST AGE 40-64: CPT | Performed by: OBSTETRICS & GYNECOLOGY

## 2021-11-01 RX ORDER — ESTRADIOL 10 UG/1
1 INSERT VAGINAL 2 TIMES WEEKLY
Qty: 24 TABLET | Refills: 4 | Status: SHIPPED | OUTPATIENT
Start: 2021-11-01 | End: 2023-01-13 | Stop reason: SDUPTHER

## 2021-11-01 RX ORDER — DIPHENOXYLATE HYDROCHLORIDE AND ATROPINE SULFATE 2.5; .025 MG/1; MG/1
TABLET ORAL DAILY
COMMUNITY

## 2021-11-01 NOTE — PROGRESS NOTES
"Well Woman Visit    CC: Scheduled annual well gyn visit  Chief Complaint   Patient presents with   • Gynecologic Exam       Myriad intake in the past?: No    declines  Tobacco/Nicotine use:  No    Contraception or HRT: using HRT     HPI:   41 y.o.     Pain:  None    Pt has no complaints today. Hormones 0.625 premarin not working as well as 0.9 would like to increase back to 0.9mg.    History: PMHx, Meds, Allergies, PSHx, Social Hx, and POBHx all reviewed and updated.  PCP: does manage PMHx and preventative labs    PHYSICAL EXAM:  /75   Ht 167.6 cm (66\")   Wt 59.4 kg (131 lb)   BMI 21.14 kg/m²  Not found.  General- NAD, alert and oriented, appropriate  Psych- Normal mood, good memory  Neck- No masses, no thyroid enlargement  CV- Regular rhythm, no murnurs  Resp- CTA to bases, no wheezes  Abdomen- Soft, non distended, non tender, no masses    Breast left-  Bilaterally symmetrical, no masses, non tender, no nipple discharge, nipple inverted- \"always\"  Breast right- Bilaterally symmetrical, no masses, non tender, no nipple discharge, nipple inverted- \"always\"    External genitalia- Normal female, no lesions  Urethra/meatus- Normal, no masses, non tender, no prolapse  Bladder- Normal, no masses, non tender, no prolapse  Vagina- Normal, mild atrophy, no lesions, no discharge.  PROLAPSE: No prolapse  Cvx- Absent  Uterus- Absent  Adnexa- Absent  Anus/Rectum/Perineum- Normal appearance, no mass, good sphincter tone, no hemorrhoids, no prolapse     Lymphatic- No palpable neck, axillary, or groin nodes  Ext- No edema, no cyanosis    Skin- No lesions, no rashes, no acanthosis nigricans      ASSESSMENT and PLAN:    Diagnoses and all orders for this visit:    1. Encounter for gynecological examination without abnormal finding (Primary)    Other orders  -     estradiol (VAGIFEM) 10 MCG tablet vaginal tablet; Insert 1 tablet into the vagina 2 (Two) Times a Week.  Dispense: 24 tablet; Refill: 4  -     estrogens, " conjugated, (Premarin) 0.9 MG tablet; Take 1 tablet by mouth Daily.  Dispense: 90 tablet; Refill: 4      Preventative:  • BREAST HEALTH- Monthly self breast exam importance and how to reviewed. MMG and/or MRI (prn) reviewed per society guidelines and her individual history. Screen: Updated today.  • CERVICAL CANCER Screening- Reviewed current ASCCP guidelines for screening w and wo cotest HPV, age specific.  Screen: Not medically needed.  • COLON CANCER Screening- Reviewed current medical society guidelines and options.  Screen:  Not medically needed.  • BONE HEALTH- Reviewed current medical society guidelines and options for testing, calcium and vit D intake.  Weight bearing exercise.  DEXA: PCP orders, is up to date..  • VACCINATIONS Recommended: COVID and booster PRN, Flu annually, Tdap [id number].  Importance discussed, risk being unvaccinated reviewed.  Questions answered  • Smoking status- NON SMOKER.  Importance of avoiding second hand smoke.  • Follow up PCP/Specialist PMHx and Labs  • MYRIAD: declined tablet    She understands the importance of having any ordered tests to be performed in a timely fashion.  The risks of not performing them include, but are not limited to, advanced cancer stages, bone loss from osteoporosis and/or subsequent increase in morbidity and/or mortality.  She is encouraged to review her results online and/or contact or office if she has questions.     HRT R/B/A/SE/E all options discussed w pt.         Pt declines patches, prefers oral.    Follow Up:  Return in about 1 year (around 11/1/2022) for WWE.          Imani Lewis, DO  11/01/2021    [facility]  [address] [place] [address]  Dept: [phone]  Dept Fax: [phone]  Loc: [phone]  Loc Fax: [phone]

## 2022-01-07 ENCOUNTER — TELEMEDICINE (OUTPATIENT)
Dept: INTERNAL MEDICINE | Facility: CLINIC | Age: 42
End: 2022-01-07

## 2022-01-07 DIAGNOSIS — F33.0 MAJOR DEPRESSIVE DISORDER, RECURRENT, MILD: Primary | ICD-10-CM

## 2022-01-07 DIAGNOSIS — N95.8 ARTIFICIAL MENOPAUSE STATE: ICD-10-CM

## 2022-01-07 PROCEDURE — 99213 OFFICE O/P EST LOW 20 MIN: CPT | Performed by: INTERNAL MEDICINE

## 2022-01-07 RX ORDER — BUPROPION HYDROCHLORIDE 100 MG/1
100 TABLET, EXTENDED RELEASE ORAL DAILY
Qty: 90 TABLET | Refills: 1 | Status: SHIPPED | OUTPATIENT
Start: 2022-01-07 | End: 2022-06-15 | Stop reason: SDUPTHER

## 2022-01-07 NOTE — ASSESSMENT & PLAN NOTE
Doing great, will decrease wellbutrin, discussed that after 3 months she can go off altogether if she wishes.

## 2022-01-07 NOTE — PROGRESS NOTES
Chief Complaint  Follow up for anxiety    Subjective          Violeta Gaona presents to [facility]  History of Present Illness    Patient understanding that this is a telehealth visit.  I cannot perform a full physical exam, therefore something might be missed, or patient may need to come into the office for further evaluation.  Patient is understanding and consents to this type of visit.  My Chart    States that she is doing really well  Would like to try decreasing her meds  Did ok with the holidays even after the loss of a loved one  Feels like she isn't sleeping as well and things this is likely from the wellbutrin  Also notices a little more agiation and attributes this to the wellbutrin as well    States that the estrogen is working really well for her    No chest pain  No trouble breathing        Objective   Vital Signs:   There were no vitals taken for this visit.    Physical Exam   Result Review :     Gen: well-nourished, no acute distress  HENT: atraumatic, normocephalic  Eyes: extraocular movements intact, no scleral icterus  Lung: breathing comfortably, no cough  Skin: no visible rash, no lesions  Neuro: grossly oriented to person, place, and time. no facial droop   Psych: normal mood and affect      Common labs    Common Labsle 7/9/21 7/9/21 7/9/21 7/9/21    1105 1105 1105 1105   Glucose   91    BUN   11    Creatinine   0.95    eGFR Non African Am   64    Sodium   138    Potassium   4.1    Chloride   104    Calcium   8.8    Albumin   4.50    Total Bilirubin   0.6    Alkaline Phosphatase   85    AST (SGOT)   15    ALT (SGPT)   8    WBC 4.12      Hemoglobin 13.4      Hematocrit 40.4      Platelets 246      Total Cholesterol    150   Triglycerides    69   HDL Cholesterol    62 (A)   LDL Cholesterol     74   Hemoglobin A1C  5.20     (A) Abnormal value               Procedures      Assessment and Plan    Diagnoses and all orders for this visit:    1. Major  depressive disorder, recurrent, mild (HCC) (Primary)  Assessment & Plan:  Doing great, will decrease wellbutrin, discussed that after 3 months she can go off altogether if she wishes.      2. Artificial menopause state  Assessment & Plan:  Doing great on current meds, cont for now      Other orders  -     buPROPion SR (Wellbutrin SR) 100 MG 12 hr tablet; Take 1 tablet by mouth Daily.  Dispense: 90 tablet; Refill: 1            Follow Up   Return in about 6 months (around 7/7/2022).  Patient was given instructions and counseling regarding her condition or for health maintenance advice. Please see specific information pulled into the AVS if appropriate.

## 2022-01-25 ENCOUNTER — TELEMEDICINE (OUTPATIENT)
Dept: INTERNAL MEDICINE | Facility: CLINIC | Age: 42
End: 2022-01-25

## 2022-01-25 VITALS
BODY MASS INDEX: 21.53 KG/M2 | WEIGHT: 134 LBS | HEART RATE: 86 BPM | SYSTOLIC BLOOD PRESSURE: 128 MMHG | OXYGEN SATURATION: 98 % | DIASTOLIC BLOOD PRESSURE: 70 MMHG | HEIGHT: 66 IN | TEMPERATURE: 98.6 F | RESPIRATION RATE: 16 BRPM

## 2022-01-25 DIAGNOSIS — B02.9 HERPES ZOSTER WITHOUT COMPLICATION: Primary | ICD-10-CM

## 2022-01-25 DIAGNOSIS — R21 RASH: ICD-10-CM

## 2022-01-25 PROCEDURE — 99213 OFFICE O/P EST LOW 20 MIN: CPT | Performed by: PHYSICIAN ASSISTANT

## 2022-01-25 RX ORDER — VALACYCLOVIR HYDROCHLORIDE 1 G/1
1000 TABLET, FILM COATED ORAL 3 TIMES DAILY
Qty: 21 TABLET | Refills: 0 | Status: SHIPPED | OUTPATIENT
Start: 2022-01-25 | End: 2022-02-01

## 2022-01-25 NOTE — PROGRESS NOTES
"Chief Complaint  Rash    Subjective          Violeta Gaona presents to [facility]  Pain on skin started last night  She has had shingles before  This am she started having pain over area.  She has red rash over R side side of ribs.  Denies new lotion, soap, detergent  Denies fever.   She has not tried anything otc.   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 90 tablet 1   • Calcium Carbonate 1500 (600 Ca) MG tablet Calcium 600 600 mg calcium (1,500 mg) oral tablet take 2 tablets by oral route daily   Active     • estradiol (VAGIFEM) 10 MCG tablet vaginal tablet Insert 1 tablet into the vagina 2 (Two) Times a Week. 24 tablet 4   • estrogens, conjugated, (Premarin) 0.9 MG tablet Take 1 tablet by mouth Daily. 90 tablet 4   • multivitamin (MULTI-VITAMIN DAILY PO) Take  by mouth Daily.     • chlorhexidine (PERIDEX) 0.12 % solution Rinse with 1/2 ounce twice daily, start 2 days prior to surgery 473 mL 2     No current facility-administered medications on file prior to visit.        Allergies   Allergen Reactions   • Escitalopram Hives       Social History     Tobacco Use   Smoking Status Never Smoker   Smokeless Tobacco Never Used          Objective   Vital Signs:   /70   Pulse 86   Temp 98.6 °F (37 °C)   Resp 16   Ht 167.6 cm (66\")   Wt 60.8 kg (134 lb)   SpO2 98% "   BMI 21.63 kg/m²     Physical Exam  Vitals reviewed.   Constitutional:       Appearance: Normal appearance.   HENT:      Head: Normocephalic and atraumatic.      Nose: Nose normal.      Mouth/Throat:      Mouth: Mucous membranes are moist.   Eyes:      Extraocular Movements: Extraocular movements intact.      Conjunctiva/sclera: Conjunctivae normal.      Pupils: Pupils are equal, round, and reactive to light.   Cardiovascular:      Rate and Rhythm: Normal rate and regular rhythm.   Pulmonary:      Effort: Pulmonary effort is normal.      Breath sounds: Normal breath sounds.   Abdominal:      General: Abdomen is flat. Bowel sounds are normal.      Palpations: Abdomen is soft.   Musculoskeletal:         General: Normal range of motion.   Skin:     Findings: Rash (erythematous rash under R ribs, vesicles on a red base) present.   Neurological:      General: No focal deficit present.      Mental Status: She is alert and oriented to person, place, and time.   Psychiatric:         Mood and Affect: Mood normal.        Result Review :                 Assessment and Plan    Diagnoses and all orders for this visit:    1. Herpes zoster without complication (Primary)  Assessment & Plan:  Discussed shingles. Will start antiviral today. Tylenol/motrin prn pain. Pt will let us know if sx worsen or do not improve with conservative tx.      2. Rash    Other orders  -     valACYclovir (Valtrex) 1000 MG tablet; Take 1 tablet by mouth 3 (Three) Times a Day for 7 days.  Dispense: 21 tablet; Refill: 0      Follow Up   No follow-ups on file.  Patient was given instructions and counseling regarding her condition or for health maintenance advice. Please see specific information pulled into the AVS if appropriate.

## 2022-01-26 PROBLEM — B02.9 HERPES ZOSTER WITHOUT COMPLICATION: Status: ACTIVE | Noted: 2022-01-26

## 2022-01-26 PROBLEM — B02.9 HERPES ZOSTER WITHOUT COMPLICATION: Status: RESOLVED | Noted: 2022-01-26 | Resolved: 2022-01-26

## 2022-01-26 NOTE — ASSESSMENT & PLAN NOTE
Discussed shingles. Will start antiviral today. Tylenol/motrin prn pain. Pt will let us know if sx worsen or do not improve with conservative tx.

## 2022-02-18 ENCOUNTER — LAB (OUTPATIENT)
Dept: LAB | Facility: HOSPITAL | Age: 42
End: 2022-02-18

## 2022-02-18 ENCOUNTER — TELEMEDICINE (OUTPATIENT)
Dept: FAMILY MEDICINE CLINIC | Facility: TELEHEALTH | Age: 42
End: 2022-02-18

## 2022-02-18 DIAGNOSIS — J06.9 VIRAL UPPER RESPIRATORY TRACT INFECTION: ICD-10-CM

## 2022-02-18 DIAGNOSIS — J06.9 VIRAL UPPER RESPIRATORY TRACT INFECTION: Primary | ICD-10-CM

## 2022-02-18 LAB — SARS-COV-2 RNA PNL SPEC NAA+PROBE: NOT DETECTED

## 2022-02-18 PROCEDURE — BHEMPVIDEOVISIT: Performed by: NURSE PRACTITIONER

## 2022-02-18 PROCEDURE — U0004 COV-19 TEST NON-CDC HGH THRU: HCPCS

## 2022-02-18 NOTE — PATIENT INSTRUCTIONS
You will need to upload your positive COVID-19 test  on [facility] at [email or web address]     I have included a COVID-19 test. Go to your nearest [facility] for testing. Tell them you have already been seen virtually and only need testing   We will notify you of your COVID-19 results by phone. You will receive a call from an unknown or blocked number. You can also get your results on your [facility] mauro.    You will need to remain quarantined for 10 days from onset of symptoms and only to discontinue if symptoms have improved and no fever for 24 hours without the use of fever reducing medications such as Tylenol (acetaminophen), Advil (ibuprfen), or Aleve (naproxen). You may discontinue after 5 days if your symptoms have resolved and you have no fever for 24 hours without the use of fever reducing medications such as Tylenol (acetaminophen), Advil (ibuprfen), or Aleve (naproxen). You will need to repeat the home antigen test supplied by [facility] and it must be negative to return to work earlier than 10 days.   Continue to wear a mask for 10 days or until symptoms resolve    Symptoms of Coronavirus are treated just as you would for any viral illness. Take Tylenol and/or Ibuprofen for pain and/or fever, you may find it better to alternate these every 4 hours, so that you are only taking each every 8 hours. Stay hydrated, rest and you may treat cough with over-the-counter cough syrup such as Robitussin.  If your symptoms do not improve, symptoms change or do not fully resolve, seek further evaluation with your primary care provider or Urgent Care.     If you develop severe symptoms, such as chest pain, high fever, difficulty breathing, difficulty urinating, confusion, difficulty staying awake, blue or pale lips or have any symptoms that interfere with your ability to function go to the nearest Emergency Department immediately.

## 2022-02-18 NOTE — PROGRESS NOTES
You have chosen to receive care through a telehealth visit.  Do you consent to use a video/audio connection for your medical care today? Yes     CHIEF COMPLAINT  Chief Complaint   Patient presents with   • Fever   • URI         HPI  Violeta Gaona is a 41 y.o. female  presents with complaint of low grade fever with nasal congestion.     Review of Systems   Constitutional: Positive for fatigue and fever. Negative for chills and diaphoresis.   HENT: Positive for congestion, postnasal drip, rhinorrhea, sinus pressure, sneezing and sore throat. Negative for sinus pain.    Respiratory: Positive for cough (mild). Negative for chest tightness, shortness of breath and wheezing.    Cardiovascular: Negative for chest pain.   Gastrointestinal: Negative for diarrhea, nausea and vomiting.   Musculoskeletal: Negative for myalgias.   Neurological: Positive for headaches.       Past Medical History:   Diagnosis Date   • Acid reflux disease    • Anxiety    • Artificial menopause state 12/16/2014   • Broken bones 1997   • Depression    • Hemorrhoids    • Night sweats        Family History   Problem Relation Age of Onset   • Heart disease Maternal Grandmother    • Diabetes Maternal Grandmother    • Heart disease Maternal Grandfather    • Diabetes Maternal Grandfather    • Brain cancer Maternal Grandfather    • Osteoporosis Other        Social History     Socioeconomic History   • Marital status:    Tobacco Use   • Smoking status: Never Smoker   • Smokeless tobacco: Never Used   Substance and Sexual Activity   • Alcohol use: Never   • Drug use: Never         There were no vitals taken for this visit.    PHYSICAL EXAM  Physical Exam   Constitutional: She is oriented to person, place, and time. She appears well-developed and well-nourished. She does not have a sickly appearance. She does not appear ill. No distress.   HENT:   Head: Normocephalic and atraumatic.   Eyes: EOM are normal.   Neck: Neck normal appearance.  Pulmonary/Chest:  Effort normal.  No respiratory distress.  Neurological: She is alert and oriented to person, place, and time.   Skin: Skin is dry.   Psychiatric: She has a normal mood and affect.         Diagnoses and all orders for this visit:    1. Viral upper respiratory tract infection (Primary)  -     COVID-19,APTIMA PANTHER(NONA),[facility], NP/OP SWAB IN UTM/VTM/[facility] [employer] MEDIA,24 HR TAT - Swab, Nasopharynx; Future  -     QUESTIONNAIRE SERIES          FOLLOW-UP  As discussed during visit with PCP/[facility] if no improvement or Urgent Care/Emergency Department if worsening of symptoms    Patient verbalizes understanding of medication dosage, comfort measures, instructions for treatment and follow-up.    Ketty Melo, TIFFANY  02/18/2022  09:54 EST    This visit was performed via Telehealth.  This patient has been instructed to follow-up with their primary care provider if their symptoms worsen or the treatment provided does not resolve their illness.

## 2022-06-15 ENCOUNTER — OFFICE VISIT (OUTPATIENT)
Dept: INTERNAL MEDICINE | Facility: CLINIC | Age: 42
End: 2022-06-15

## 2022-06-15 VITALS
HEART RATE: 51 BPM | OXYGEN SATURATION: 100 % | HEIGHT: 65 IN | WEIGHT: 135.8 LBS | TEMPERATURE: 97 F | SYSTOLIC BLOOD PRESSURE: 102 MMHG | BODY MASS INDEX: 22.63 KG/M2 | DIASTOLIC BLOOD PRESSURE: 58 MMHG

## 2022-06-15 DIAGNOSIS — J30.9 ALLERGIC RHINITIS, UNSPECIFIED SEASONALITY, UNSPECIFIED TRIGGER: ICD-10-CM

## 2022-06-15 DIAGNOSIS — F33.0 MAJOR DEPRESSIVE DISORDER, RECURRENT, MILD: ICD-10-CM

## 2022-06-15 DIAGNOSIS — F41.9 ANXIETY: ICD-10-CM

## 2022-06-15 DIAGNOSIS — Z12.31 VISIT FOR SCREENING MAMMOGRAM: ICD-10-CM

## 2022-06-15 DIAGNOSIS — N95.8 ARTIFICIAL MENOPAUSE STATE: ICD-10-CM

## 2022-06-15 DIAGNOSIS — Z00.00 ANNUAL PHYSICAL EXAM: Primary | ICD-10-CM

## 2022-06-15 PROCEDURE — 99396 PREV VISIT EST AGE 40-64: CPT | Performed by: NURSE PRACTITIONER

## 2022-06-15 RX ORDER — BUPROPION HYDROCHLORIDE 100 MG/1
100 TABLET, EXTENDED RELEASE ORAL DAILY
Qty: 90 TABLET | Refills: 1 | Status: SHIPPED | OUTPATIENT
Start: 2022-06-15 | End: 2022-12-21

## 2022-06-15 NOTE — ASSESSMENT & PLAN NOTE
Patient to have routine labs drawn through employer, will return to clinic if she would like to pursue that was here.  Discussed routine dental and eye exams as well as age appropriate immunizations, screenings.  Patient to follow-up in 6 months with Dr. Finnegan, sooner if concerns arise.

## 2022-06-15 NOTE — ASSESSMENT & PLAN NOTE
Anxiety/depression well controlled, continue Wellbutrin. Patient to continue to monitor, she will seek medical attention immediately if she feels that her mental health is deteriorating. Denies SI/HI. Will continue to monitor.

## 2022-06-15 NOTE — PROGRESS NOTES
"Chief Complaint  Med Refill, Orders (For Mammogram ), and Annual Exam    Subjective        Violeta Gaona presents to [facility]  Last labs: 7/2021  LMP: HRT since age 22  PAP: Annually through Dr. Lewis  Mammogram: Denies family history of breast cancer  Colonoscopy: Denies family history of colon cancer  COVID19 vaccination: Up to date  Hepatitis A vaccination: Up to date  Eye exam: Due  Dental exam: Every 6 months  Smoking history: Denies  Specialists: OB/GYN, Allergist     Annual physical exam-  Patient denies significant family history of cardiovascular events. Maternal grandmother with atrial fibrillation. Denies chest pain, blurry vision, headache, leg swelling, shortness of breath, seizure activity.     Anxiety/depression-  Well-controlled with Wellbutrin.  Patient admits that symptoms started after the loss of multiple family members, including her brother.  States that she feels like she has continued to improve over time and also with the aid of Wellbutrin.  She and her mother attended grief counseling, stopped visits but did follow-up last month as the anniversary of her brother's death approached.  She feels like she is doing well on the current dosage of medication and has no further concerns today.    Allergic rhinitis-  Well-controlled with allergy injections.        Objective   Vital Signs:  /58 (BP Location: Left arm, Patient Position: Sitting, Cuff Size: Adult)   Pulse 51   Temp 97 °F (36.1 °C) (Temporal)   Ht 165.1 cm (65\")   Wt 61.6 kg (135 lb 12.8 oz)   SpO2 100%   BMI 22.60 kg/m²   Estimated body mass index is 22.6 kg/m² as calculated from the following:    Height as of this encounter: 165.1 cm (65\").    Weight as of this encounter: 61.6 kg (135 lb 12.8 oz).    BMI is within normal parameters. No other follow-up for BMI required.      Physical Exam  Constitutional:       Appearance: Normal appearance.   HENT:      Head: Normocephalic " and atraumatic.      Right Ear: Tympanic membrane normal.      Left Ear: Tympanic membrane normal.      Nose: Nose normal.      Mouth/Throat:      Mouth: Mucous membranes are moist.      Pharynx: Oropharynx is clear.   Eyes:      Extraocular Movements: Extraocular movements intact.      Conjunctiva/sclera: Conjunctivae normal.      Pupils: Pupils are equal, round, and reactive to light.   Neck:      Thyroid: No thyroid mass, thyromegaly or thyroid tenderness.   Cardiovascular:      Rate and Rhythm: Normal rate and regular rhythm.      Heart sounds: Normal heart sounds.   Pulmonary:      Effort: Pulmonary effort is normal.      Breath sounds: Normal breath sounds.   Skin:     General: Skin is warm and dry.   Neurological:      General: No focal deficit present.      Mental Status: She is alert and oriented to person, place, and time.   Psychiatric:         Mood and Affect: Mood normal.         Behavior: Behavior normal.         Thought Content: Thought content normal.        Result Review :                Assessment and Plan   Diagnoses and all orders for this visit:    1. Annual physical exam (Primary)  Assessment & Plan:  Patient to have routine labs drawn through employer, will return to clinic if she would like to pursue that was here.  Discussed routine dental and eye exams as well as age appropriate immunizations, screenings.  Patient to follow-up in 6 months with Dr. Finnegan, sooner if concerns arise.        2. Major depressive disorder, recurrent, mild (HCC)  Assessment & Plan:  Anxiety/depression well controlled, continue Wellbutrin. Patient to continue to monitor, she will seek medical attention immediately if she feels that her mental health is deteriorating. Denies SI/HI. Will continue to monitor.         3. Anxiety    4. Artificial menopause state  Assessment & Plan:  Associated osteopenia, DEXA scan ordered.    Orders:  -     DEXA Bone Density Axial    5. Visit for screening  mammogram  Comments:  Mammogram ordered.  Orders:  -     Mammo Screening Digital Tomosynthesis Bilateral With CAD; Future    6. Allergic rhinitis, unspecified seasonality, unspecified trigger  Comments:  Continue to follow with allergist for allergy injections.    Other orders  -     buPROPion SR (Wellbutrin SR) 100 MG 12 hr tablet; Take 1 tablet by mouth Daily.  Dispense: 90 tablet; Refill: 1           Follow Up   No follow-ups on file.  Patient was given instructions and counseling regarding her condition or for health maintenance advice. Please see specific information pulled into the AVS if appropriate.

## 2022-08-19 ENCOUNTER — PATIENT MESSAGE (OUTPATIENT)
Dept: INTERNAL MEDICINE | Facility: CLINIC | Age: 42
End: 2022-08-19

## 2022-08-19 DIAGNOSIS — Z00.00 ANNUAL PHYSICAL EXAM: ICD-10-CM

## 2022-08-19 DIAGNOSIS — Z11.59 NEED FOR HEPATITIS C SCREENING TEST: Primary | ICD-10-CM

## 2022-08-22 NOTE — TELEPHONE ENCOUNTER
From: Violeta Gaona  To: Radha Finnegan MD  Sent: 8/19/2022 4:58 PM EDT  Subject: Annual Labs     Would like orders for annual labs please. [facility] is no longer providing them.   Thank you.

## 2022-09-21 ENCOUNTER — HOSPITAL ENCOUNTER (OUTPATIENT)
Dept: MAMMOGRAPHY | Facility: HOSPITAL | Age: 42
Discharge: HOME | End: 2022-09-21

## 2022-09-21 ENCOUNTER — HOSPITAL ENCOUNTER (OUTPATIENT)
Dept: BONE DENSITY | Facility: HOSPITAL | Age: 42
Discharge: HOME | End: 2022-09-21

## 2022-09-21 DIAGNOSIS — Z12.31 VISIT FOR SCREENING MAMMOGRAM: ICD-10-CM

## 2022-09-21 PROCEDURE — 77067 SCR MAMMO BI INCL CAD: CPT

## 2022-09-21 PROCEDURE — 77080 DXA BONE DENSITY AXIAL: CPT

## 2022-09-21 PROCEDURE — 77063 BREAST TOMOSYNTHESIS BI: CPT

## 2022-09-29 ENCOUNTER — LAB (OUTPATIENT)
Dept: LAB | Facility: HOSPITAL | Age: 42
End: 2022-09-29

## 2022-09-29 DIAGNOSIS — Z11.59 NEED FOR HEPATITIS C SCREENING TEST: ICD-10-CM

## 2022-09-29 LAB
BASOPHILS # BLD AUTO: 0.06 10*3/MM3 (ref 0–0.2)
BASOPHILS NFR BLD AUTO: 1.2 % (ref 0–1.5)
CHOLEST SERPL-MCNC: 168 MG/DL (ref 0–200)
DEPRECATED RDW RBC AUTO: 41.7 FL (ref 37–54)
EOSINOPHIL # BLD AUTO: 0.05 10*3/MM3 (ref 0–0.4)
EOSINOPHIL NFR BLD AUTO: 1 % (ref 0.3–6.2)
ERYTHROCYTE [DISTWIDTH] IN BLOOD BY AUTOMATED COUNT: 12.4 % (ref 12.3–15.4)
HCT VFR BLD AUTO: 39.7 % (ref 34–46.6)
HCV AB SER DONR QL: NORMAL
HDLC SERPL-MCNC: 73 MG/DL (ref 40–60)
HGB BLD-MCNC: 13.3 G/DL (ref 12–15.9)
IMM GRANULOCYTES # BLD AUTO: 0.01 10*3/MM3 (ref 0–0.05)
IMM GRANULOCYTES NFR BLD AUTO: 0.2 % (ref 0–0.5)
LDLC SERPL CALC-MCNC: 77 MG/DL (ref 0–100)
LDLC/HDLC SERPL: 1.02 {RATIO}
LYMPHOCYTES # BLD AUTO: 1.69 10*3/MM3 (ref 0.7–3.1)
LYMPHOCYTES NFR BLD AUTO: 33.7 % (ref 19.6–45.3)
MCH RBC QN AUTO: 30.7 PG (ref 26.6–33)
MCHC RBC AUTO-ENTMCNC: 33.5 G/DL (ref 31.5–35.7)
MCV RBC AUTO: 91.7 FL (ref 79–97)
MONOCYTES # BLD AUTO: 0.32 10*3/MM3 (ref 0.1–0.9)
MONOCYTES NFR BLD AUTO: 6.4 % (ref 5–12)
NEUTROPHILS NFR BLD AUTO: 2.88 10*3/MM3 (ref 1.7–7)
NEUTROPHILS NFR BLD AUTO: 57.5 % (ref 42.7–76)
NRBC BLD AUTO-RTO: 0 /100 WBC (ref 0–0.2)
PLATELET # BLD AUTO: 282 10*3/MM3 (ref 140–450)
PMV BLD AUTO: 10.6 FL (ref 6–12)
RBC # BLD AUTO: 4.33 10*6/MM3 (ref 3.77–5.28)
TRIGL SERPL-MCNC: 104 MG/DL (ref 0–150)
TSH SERPL DL<=0.05 MIU/L-ACNC: 1.53 UIU/ML (ref 0.27–4.2)
VLDLC SERPL-MCNC: 18 MG/DL (ref 5–40)
WBC NRBC COR # BLD: 5.01 10*3/MM3 (ref 3.4–10.8)

## 2022-09-29 PROCEDURE — 36415 COLL VENOUS BLD VENIPUNCTURE: CPT

## 2022-09-29 PROCEDURE — 80050 GENERAL HEALTH PANEL: CPT

## 2022-09-29 PROCEDURE — 80061 LIPID PANEL: CPT

## 2022-09-29 PROCEDURE — 86803 HEPATITIS C AB TEST: CPT

## 2022-09-30 LAB
ALBUMIN SERPL-MCNC: 4.5 G/DL (ref 3.5–5.2)
ALBUMIN/GLOB SERPL: 1.6 G/DL
ALP SERPL-CCNC: 93 U/L (ref 39–117)
ALT SERPL W P-5'-P-CCNC: 8 U/L (ref 1–33)
ANION GAP SERPL CALCULATED.3IONS-SCNC: 11.6 MMOL/L (ref 5–15)
AST SERPL-CCNC: 18 U/L (ref 1–32)
BILIRUB SERPL-MCNC: 0.7 MG/DL (ref 0–1.2)
BUN SERPL-MCNC: 9 MG/DL (ref 6–20)
BUN/CREAT SERPL: 13.8 (ref 7–25)
CALCIUM SPEC-SCNC: 8.9 MG/DL (ref 8.6–10.5)
CHLORIDE SERPL-SCNC: 98 MMOL/L (ref 98–107)
CO2 SERPL-SCNC: 25.4 MMOL/L (ref 22–29)
CREAT SERPL-MCNC: 0.65 MG/DL (ref 0.57–1)
EGFRCR SERPLBLD CKD-EPI 2021: 111.5 ML/MIN/1.73
GLOBULIN UR ELPH-MCNC: 2.9 GM/DL
GLUCOSE SERPL-MCNC: 93 MG/DL (ref 65–99)
POTASSIUM SERPL-SCNC: 3.9 MMOL/L (ref 3.5–5.2)
PROT SERPL-MCNC: 7.4 G/DL (ref 6–8.5)
SODIUM SERPL-SCNC: 135 MMOL/L (ref 136–145)

## 2022-11-28 ENCOUNTER — TELEMEDICINE (OUTPATIENT)
Dept: FAMILY MEDICINE CLINIC | Facility: TELEHEALTH | Age: 42
End: 2022-11-28

## 2022-11-28 DIAGNOSIS — U07.1 COVID-19: Primary | ICD-10-CM

## 2022-11-28 PROCEDURE — 99213 OFFICE O/P EST LOW 20 MIN: CPT | Performed by: NURSE PRACTITIONER

## 2022-11-28 NOTE — PROGRESS NOTES
HPI  Violeta Gaona is a 42 y.o. female  presents with complaint of positive covid test today. Symptoms started today including fever, body aches, loss of taste, congestion, mild cough, nausea. Denies SOA, CP, V/D. Currently overdue for allergy shot.     Review of Systems    Past Medical History:   Diagnosis Date   • Acid reflux disease    • Allergic 2011   • Anxiety    • Artificial menopause state 12/16/2014   • Broken bones 1997   • Depression    • Hemorrhoids    • Night sweats    • Osteopenia        Family History   Problem Relation Age of Onset   • Heart disease Maternal Grandmother    • Diabetes Maternal Grandmother    • Heart disease Maternal Grandfather    • Diabetes Maternal Grandfather    • Brain cancer Maternal Grandfather    • Osteoporosis Other        Social History     Socioeconomic History   • Marital status:    Tobacco Use   • Smoking status: Never   • Smokeless tobacco: Never   Substance and Sexual Activity   • Alcohol use: Never   • Drug use: Never   • Sexual activity: Not Currently     Partners: Male     Birth control/protection: Post-menopausal         There were no vitals taken for this visit.    PHYSICAL EXAM  Physical Exam   Constitutional: She appears well-developed and well-nourished.   HENT:   Head: Normocephalic.   Nose: Nose normal.   Neck: Neck normal appearance.  Pulmonary/Chest: Effort normal.   Neurological: She is alert.   Psychiatric: She has a normal mood and affect. Her speech is normal.       Diagnoses and all orders for this visit:    1. COVID-19 (Primary)    *Alternate tylenol/motrin for pain/fever.   *Increase fluids, rest.   *Declined any Rx meds at this time. *Encouraged to reach out if need nausea med, cough med, longer work excuse.       FOLLOW-UP  As discussed during visit with [facility], if symptoms worsen or fail to improve, follow-up with PCP/Urgent Care/Emergency Department.    Patient verbalizes understanding of medications, instructions for treatment and  follow-up.    Delaney Campos Bia, APRN  11/28/2022  18:15 EST    The use of a video visit has been reviewed with the patient and verbal informed consent has been obtained. Myself and Violeta Gaona participated in this visit. The patient is located in [place], [place], and I am located in [place], [place]. MyChart and Zoom were utilized.

## 2022-11-28 NOTE — PATIENT INSTRUCTIONS
10 Things You Can Do to Manage Your COVID-19 Symptoms at Home  If you have possible or confirmed COVID-19  Stay home except to get medical care.  Monitor your symptoms carefully. If your symptoms get worse, call your healthcare provider immediately.  Get rest and stay hydrated.  If you have a medical appointment, call the healthcare provider ahead of time and tell them that you have or may have COVID-19.  For medical emergencies, call 911 and notify the dispatch personnel that you have or may have COVID-19.  Cover your cough and sneezes with a tissue or use the inside of your elbow.  Wash your hands often with soap and water for at least 20 seconds or clean your hands with an alcohol-based hand  that contains at least 60% alcohol.  As much as possible, stay in a specific room and away from other people in your home. Also, you should use a separate bathroom, if available. If you need to be around other people in or outside of the home, wear a mask.  Avoid sharing personal items with other people in your household, like dishes, towels, and bedding.  Clean all surfaces that are touched often, like counters, tabletops, and doorknobs. Use household cleaning sprays or wipes according to the label instructions.  cdc.gov/coronavirus  07/16/2021  This information is not intended to replace advice given to you by your health care provider. Make sure you discuss any questions you have with your health care provider.  Document Revised: 09/09/2022 Document Reviewed: 09/09/2022  [employer] Patient Education © 2022 [employer]  How to Quarantine at Home  Information for Patients and Families    These instructions are for people with confirmed or suspected COVID-19 who do not need to be hospitalized and those with confirmed COVID-19 who were hospitalized and discharged to care for themselves at home.    If you were tested through the Health Department  The Health Department will monitor your wellbeing.  If it is determined  that you do not need to be hospitalized and can be isolated at home, you will be monitored by staff from your local or state health department.     If you were tested through a Commercial Lab  You will need to monitor yourself and report changes in your symptoms to your doctor.  See the section below called Monitor Your Symptoms.    Follow these steps until a healthcare provider or local or state health department says you can return to your normal activities.    Stay home except to get medical care  Restrict activities outside your home, except for getting medical care.   Do not go to work, school, or public areas.   Avoid using public transportation, ride-sharing, or taxis.    Separate yourself from other people and animals in your home  People  As much as possible, you should stay in a specific room and away from other people in your home. Also, you should use a separate bathroom, if available.    Animals  You should restrict contact with pets and other animals while you are sick with COVID-19, just like you would around other people. When possible, have another member of your household care for your animals while you are sick. If you are sick with COVID-19, avoid contact with your pet, including petting, snuggling, being kissed or licked, and sharing food. If you must care for your pet or be around animals while you are sick, wash your hands before and after you interact with pets and wear a facemask. See COVID-19 and Animals for more information.    Call ahead before visiting your doctor  If you have a medical appointment, call the healthcare provider and tell them that you have or may have COVID-19. This information will help the healthcare provider’s office take steps to keep other people from getting infected or exposed.    Wear a facemask  You should wear a facemask when you are around other people (e.g., sharing a room or vehicle) or pets and before you enter a healthcare provider’s office.     If you are  not able to wear a facemask (for example, because it causes trouble breathing), then people who live with you should not stay in the same room with you, or they should wear a facemask if they enter your room.    Cover your coughs and sneezes  Cover your mouth and nose with a tissue when you cough or sneeze.   Throw used tissues in a lined trash can.   Immediately wash your hands with soap and water for at least 20 seconds or, if soap and water are not available, clean your hands with an alcohol-based hand  that contains at least 60% alcohol.    Clean your hands often  Wash your hands often with soap and water for at least 20 seconds, especially after blowing your nose, coughing, or sneezing; going to the bathroom; and before eating or preparing food.     If soap and water are not readily available, use an alcohol-based hand  with at least 60% alcohol, covering all surfaces of your hands and rubbing them together until they feel dry.    Soap and water are the best option if hands are visibly dirty. Avoid touching your eyes, nose, and mouth with unwashed hands.    Avoid sharing personal household items  You should not share dishes, drinking glasses, cups, eating utensils, towels, or bedding with other people or pets in your home.   After using these items, they should be washed thoroughly with soap and water.    Clean all “high-touch” surfaces everyday  High touch surfaces include counters, tabletops, doorknobs, bathroom fixtures, toilets, phones, keyboards, tablets, and bedside tables.   Also, clean any surfaces that may have blood, stool, or body fluids on them.   Use a household cleaning spray or wipe, according to the label instructions. Labels contain instructions for safe and effective use of the cleaning product, including precautions you should take when applying the product, such as wearing gloves and making sure you have good ventilation during use of the product.    Monitor your  symptoms  Seek prompt medical attention if your illness is worsening (e.g., difficulty breathing).   Before seeking care, call your healthcare provider and tell them that you have, or are being evaluated for, COVID-19.   Put on a facemask before you enter the facility.     These steps will help the healthcare provider’s office to keep other people in the office or waiting room from getting infected or exposed.   Persons who are placed under active monitoring or facilitated self-monitoring should follow instructions provided by their local health department or occupational health professionals, as appropriate.  If you have a medical emergency and need to call 911, notify the dispatch personnel that you have, or are being evaluated for COVID-19. If possible, put on a facemask before emergency medical services arrive.    Discontinuing home isolation  Patients with confirmed COVID-19 should remain under home isolation precautions until the risk of secondary transmission to others is thought to be low. The decision to discontinue home isolation precautions should be made on a case-by-case basis, in consultation with healthcare providers and state and local health departments.    The below content are for household members, intimate partners, and caregivers of a patient with symptomatic laboratory-confirmed COVID-19 or a patient under investigation:    Household members, intimate partners, and caregivers may have close contact with a person with symptomatic, laboratory-confirmed COVID-19 or a person under investigation.     Close contacts should monitor their health; they should call their healthcare provider right away if they develop symptoms suggestive of COVID-19 (e.g., fever, cough, shortness of breath)     Close contacts should also follow these recommendations:  Make sure that you understand and can help the patient follow their healthcare provider’s instructions for medication(s) and care. You should help the  patient with basic needs in the home and provide support for getting groceries, prescriptions, and other personal needs.  Monitor the patient’s symptoms. If the patient is getting sicker, call his or her healthcare provider and tell them that the patient has laboratory-confirmed COVID-19. This will help the healthcare provider’s office take steps to keep other people in the office or waiting room from getting infected. Ask the healthcare provider to call the local or [facility] health department for additional guidance. If the patient has a medical emergency and you need to call 911, notify the dispatch personnel that the patient has, or is being evaluated for COVID-19.  Household members should stay in another room or be  from the patient as much as possible. Household members should use a separate bedroom and bathroom, if available.  Prohibit visitors who do not have an essential need to be in the home.  Household members should care for any pets in the home. Do not handle pets or other animals while sick.  For more information, see COVID-19 and Animals.  Make sure that shared spaces in the home have good air flow, such as by an air conditioner or an opened window, weather permitting.  Perform hand hygiene frequently. Wash your hands often with soap and water for at least 20 seconds or use an alcohol-based hand  that contains 60 to 95% alcohol, covering all surfaces of your hands and rubbing them together until they feel dry. Soap and water should be used preferentially if hands are visibly dirty.  Avoid touching your eyes, nose, and mouth with unwashed hands.  The patient should wear a facemask when you are around other people. If the patient is not able to wear a facemask (for example, because it causes trouble breathing), you, as the caregiver, should wear a mask when you are in the same room as the patient.  Wear a disposable facemask and gloves when you touch or have contact with the patient’s  blood, stool, or body fluids, such as saliva, sputum, nasal mucus, vomit, or urine.   Throw out disposable facemasks and gloves after using them. Do not reuse.  When removing personal protective equipment, first remove and dispose of gloves. Then, immediately clean your hands with soap and water or alcohol-based hand . Next, remove and dispose of facemask, and immediately clean your hands again with soap and water or alcohol-based hand .  Avoid sharing household items with the patient. You should not share dishes, drinking glasses, cups, eating utensils, towels, bedding, or other items. After the patient uses these items, you should wash them thoroughly (see below “Wash laundry thoroughly”).  Clean all “high-touch” surfaces, such as counters, tabletops, doorknobs, bathroom fixtures, toilets, phones, keyboards, tablets, and bedside tables, every day. Also, clean any surfaces that may have blood, stool, or body fluids on them.   Use a household cleaning spray or wipe, according to the label instructions. Labels contain instructions for safe and effective use of the cleaning product including precautions you should take when applying the product, such as wearing gloves and making sure you have good ventilation during use of the product.  Wash laundry thoroughly.   Immediately remove and wash clothes or bedding that have blood, stool, or body fluids on them.  Wear disposable gloves while handling soiled items and keep soiled items away from your body. Clean your hands (with soap and water or an alcohol-based hand ) immediately after removing your gloves.  Read and follow directions on labels of laundry or clothing items and detergent. In general, using a normal laundry detergent according to washing machine instructions and dry thoroughly using the warmest temperatures recommended on the clothing label.  Place all used disposable gloves, facemasks, and other contaminated items in a lined  container before disposing of them with other household waste. Clean your hands (with soap and water or an alcohol-based hand ) immediately after handling these items. Soap and water should be used preferentially if hands are visibly dirty.  Discuss any additional questions with your state or local health department or healthcare provider.    Adapted from information provided by the Centers for Disease Control and Prevention.  For more information, visit https://www.cdc.gov/coronavirus/2019-ncov/hcp/guidance-prevent-spread.html

## 2022-12-21 ENCOUNTER — OFFICE VISIT (OUTPATIENT)
Dept: INTERNAL MEDICINE | Facility: CLINIC | Age: 42
End: 2022-12-21
Payer: COMMERCIAL

## 2022-12-21 VITALS
HEART RATE: 72 BPM | SYSTOLIC BLOOD PRESSURE: 98 MMHG | OXYGEN SATURATION: 99 % | HEIGHT: 65 IN | BODY MASS INDEX: 23.09 KG/M2 | DIASTOLIC BLOOD PRESSURE: 64 MMHG | TEMPERATURE: 97 F | WEIGHT: 138.6 LBS

## 2022-12-21 DIAGNOSIS — F41.9 ANXIETY: ICD-10-CM

## 2022-12-21 DIAGNOSIS — F33.0 MAJOR DEPRESSIVE DISORDER, RECURRENT, MILD: ICD-10-CM

## 2022-12-21 DIAGNOSIS — J30.9 ALLERGIC RHINITIS, UNSPECIFIED SEASONALITY, UNSPECIFIED TRIGGER: Primary | ICD-10-CM

## 2022-12-21 DIAGNOSIS — N95.8 ARTIFICIAL MENOPAUSE STATE: ICD-10-CM

## 2022-12-21 PROCEDURE — 99213 OFFICE O/P EST LOW 20 MIN: CPT | Performed by: INTERNAL MEDICINE

## 2022-12-21 NOTE — PROGRESS NOTES
Chief Complaint  Follow-up (6 month follow up, weaned  self off of Wellbutrin.  ), Depression (Follow up ), Menopause (Follow up, wants to know if you want to do hormones or if you want her to see doctor Karlyen.), and Herpes Zoster (Follow up )    Subjective          Violeta Gaona presents to [facility]  History of Present Illness     Has been able to wean off her wellbutrin  States that she is doing well    No chest pain  No trouble breathing, however still having some allergy symptoms    Allergy shots every 2 weeks  Feels like they are helping, but still with some symptoms    Objective   Vital Signs:   BP 98/64 (BP Location: Right arm, Patient Position: Sitting, Cuff Size: Adult)   Pulse 72   Temp 97 °F (36.1 °C)   Ht 165.1 cm (65\")   Wt 62.9 kg (138 lb 9.6 oz)   SpO2 99%   BMI 23.06 kg/m²     Physical Exam  Vitals reviewed.   Constitutional:       Appearance: Normal appearance. She is well-developed.   HENT:      Head: Normocephalic and atraumatic.      Right Ear: External ear normal.      Left Ear: External ear normal.   Eyes:      Conjunctiva/sclera: Conjunctivae normal.      Pupils: Pupils are equal, round, and reactive to light.   Cardiovascular:      Rate and Rhythm: Normal rate and regular rhythm.      Heart sounds: No murmur heard.    No friction rub. No gallop.   Pulmonary:      Effort: Pulmonary effort is normal.      Breath sounds: Normal breath sounds. No wheezing or rhonchi.   Skin:     General: Skin is warm and dry.   Neurological:      Mental Status: She is alert and oriented to person, place, and time.   Psychiatric:         Mood and Affect: Affect normal.         Behavior: Behavior normal.         Thought Content: Thought content normal.        Result Review :       Common labs    Common Labs 9/29/22 9/29/22 9/29/22    1204 1204 1204   Glucose   93   BUN   9   Creatinine   0.65   Sodium   135 (A)   Potassium   3.9   Chloride   98   Calcium   8.9    Albumin   4.50   Total Bilirubin   0.7   Alkaline Phosphatase   93   AST (SGOT)   18   ALT (SGPT)   8   WBC 5.01     Hemoglobin 13.3     Hematocrit 39.7     Platelets 282     Total Cholesterol  168    Triglycerides  104    HDL Cholesterol  73 (A)    LDL Cholesterol   77    (A) Abnormal value              Results for orders placed or performed in visit on 09/29/22   Hepatitis C Antibody    Specimen: Blood   Result Value Ref Range    Hepatitis C Ab Non-Reactive Non-Reactive            Procedures        Assessment and Plan    Diagnoses and all orders for this visit:    1. Allergic rhinitis, unspecified seasonality, unspecified trigger (Primary)  Comments:  cont allergy shots    2. Artificial menopause state  Comments:  doing well cont to monitor    3. Major depressive disorder, recurrent, mild (HCC)  Comments:  doing much better, will cont good self care    4. Anxiety                  Follow Up   Return in about 1 year (around 12/21/2023).  Patient was given instructions and counseling regarding her condition or for health maintenance advice. Please see specific information pulled into the AVS if appropriate.

## 2023-01-13 DIAGNOSIS — N95.8 ARTIFICIAL MENOPAUSE STATE: Primary | ICD-10-CM

## 2023-01-13 RX ORDER — ESTRADIOL 10 UG/1
1 INSERT VAGINAL 2 TIMES WEEKLY
Qty: 24 TABLET | Refills: 4 | Status: SHIPPED | OUTPATIENT
Start: 2023-01-16

## 2023-01-13 NOTE — TELEPHONE ENCOUNTER
Per , she is okay with taking over these medication refills. Previously was from Dr. Lewis.   Sent in to pharmacy of patients choice.

## 2023-08-07 ENCOUNTER — LAB (OUTPATIENT)
Dept: LAB | Facility: HOSPITAL | Age: 43
End: 2023-08-07
Payer: COMMERCIAL

## 2023-08-07 ENCOUNTER — TRANSCRIBE ORDERS (OUTPATIENT)
Dept: ADMINISTRATIVE | Facility: HOSPITAL | Age: 43
End: 2023-08-07
Payer: COMMERCIAL

## 2023-08-07 DIAGNOSIS — Z12.31 VISIT FOR SCREENING MAMMOGRAM: Primary | ICD-10-CM

## 2023-08-07 PROCEDURE — 80061 LIPID PANEL: CPT | Performed by: INTERNAL MEDICINE

## 2023-08-07 PROCEDURE — 84144 ASSAY OF PROGESTERONE: CPT | Performed by: INTERNAL MEDICINE

## 2023-08-07 PROCEDURE — 83001 ASSAY OF GONADOTROPIN (FSH): CPT | Performed by: INTERNAL MEDICINE

## 2023-08-07 PROCEDURE — 83002 ASSAY OF GONADOTROPIN (LH): CPT | Performed by: INTERNAL MEDICINE

## 2023-08-07 PROCEDURE — 80050 GENERAL HEALTH PANEL: CPT | Performed by: INTERNAL MEDICINE

## 2023-08-07 PROCEDURE — 82672 ASSAY OF ESTROGEN: CPT | Performed by: INTERNAL MEDICINE

## 2023-10-26 ENCOUNTER — HOSPITAL ENCOUNTER (OUTPATIENT)
Dept: MAMMOGRAPHY | Facility: HOSPITAL | Age: 43
Discharge: HOME | End: 2023-10-26
Admitting: INTERNAL MEDICINE
Payer: COMMERCIAL

## 2023-10-26 DIAGNOSIS — Z12.31 VISIT FOR SCREENING MAMMOGRAM: ICD-10-CM

## 2023-10-26 PROCEDURE — 77063 BREAST TOMOSYNTHESIS BI: CPT

## 2023-10-26 PROCEDURE — 77067 SCR MAMMO BI INCL CAD: CPT

## 2023-12-20 ENCOUNTER — OFFICE VISIT (OUTPATIENT)
Dept: INTERNAL MEDICINE | Facility: CLINIC | Age: 43
End: 2023-12-20
Payer: COMMERCIAL

## 2023-12-20 VITALS
DIASTOLIC BLOOD PRESSURE: 64 MMHG | SYSTOLIC BLOOD PRESSURE: 98 MMHG | TEMPERATURE: 97.5 F | OXYGEN SATURATION: 99 % | HEIGHT: 65 IN | BODY MASS INDEX: 23.72 KG/M2 | WEIGHT: 142.4 LBS | HEART RATE: 69 BPM

## 2023-12-20 DIAGNOSIS — N95.8 ARTIFICIAL MENOPAUSE STATE: ICD-10-CM

## 2023-12-20 DIAGNOSIS — Z00.00 ANNUAL PHYSICAL EXAM: Primary | ICD-10-CM

## 2023-12-20 DIAGNOSIS — Z12.11 SCREENING FOR COLON CANCER: ICD-10-CM

## 2023-12-21 ENCOUNTER — TELEPHONE (OUTPATIENT)
Dept: GASTROENTEROLOGY | Facility: CLINIC | Age: 43
End: 2023-12-21
Payer: COMMERCIAL

## 2023-12-21 NOTE — TELEPHONE ENCOUNTER
Left voice message for patient to return call in regards to a referral we received from Radha Finnegan for a Screening for colon cancer.

## 2024-02-04 DIAGNOSIS — N95.8 ARTIFICIAL MENOPAUSE STATE: ICD-10-CM

## 2024-02-05 RX ORDER — ESTRADIOL 10 UG/1
1 INSERT VAGINAL 2 TIMES WEEKLY
Qty: 24 TABLET | Refills: 4 | Status: SHIPPED | OUTPATIENT
Start: 2024-02-05

## 2024-02-19 ENCOUNTER — PATIENT MESSAGE (OUTPATIENT)
Dept: INTERNAL MEDICINE | Facility: CLINIC | Age: 44
End: 2024-02-19
Payer: COMMERCIAL

## 2024-02-22 RX ORDER — VALACYCLOVIR HYDROCHLORIDE 1 G/1
1000 TABLET, FILM COATED ORAL 2 TIMES DAILY
Qty: 20 TABLET | Refills: 0 | Status: SHIPPED | OUTPATIENT
Start: 2024-02-22 | End: 2024-03-04

## 2024-02-22 NOTE — TELEPHONE ENCOUNTER
From: Violeta Gaona  To: Radha Finnegan  Sent: 2/19/2024 6:43 PM EST  Subject: Broke out with L[facility]    Broke out with lazaro this morning again, could I get a prescription called in for this please?  Thank you!

## 2024-04-25 ENCOUNTER — TELEPHONE (OUTPATIENT)
Dept: GASTROENTEROLOGY | Facility: CLINIC | Age: 44
End: 2024-04-25
Payer: COMMERCIAL

## 2024-04-25 NOTE — TELEPHONE ENCOUNTER
Patient contacted the office about a missed call for a scheduling of a procedure. Asked that we call patient back at [phone]. The office attempted to contact patient 04/10/2024 for the nurse phone appointment. Need to contact patient back to get rescheduled.

## 2024-04-26 NOTE — TELEPHONE ENCOUNTER
Returned patient voicemail, about missed appointment, rescheduled colonoscopy screening phone call for 05/01/2024 at 2:30 pm.

## 2024-05-01 ENCOUNTER — CLINICAL SUPPORT (OUTPATIENT)
Dept: GASTROENTEROLOGY | Facility: CLINIC | Age: 44
End: 2024-05-01
Payer: COMMERCIAL

## 2024-05-01 ENCOUNTER — PREP FOR SURGERY (OUTPATIENT)
Dept: OTHER | Facility: HOSPITAL | Age: 44
End: 2024-05-01
Payer: COMMERCIAL

## 2024-05-01 DIAGNOSIS — Z12.11 COLON CANCER SCREENING: Primary | ICD-10-CM

## 2024-05-01 DIAGNOSIS — Z12.11 ENCOUNTER FOR SCREENING FOR MALIGNANT NEOPLASM OF COLON: Primary | ICD-10-CM

## 2024-05-01 RX ORDER — SODIUM, POTASSIUM,MAG SULFATES 17.5-3.13G
SOLUTION, RECONSTITUTED, ORAL ORAL
Qty: 354 ML | Refills: 0 | Status: SHIPPED | OUTPATIENT
Start: 2024-05-01

## 2024-05-01 NOTE — PROGRESS NOTES
Violeta Gaona  1980  43 y.o.    Reason for call: Screening Colonoscopy  Prep prescribed: Suprep  Prep instructions reviewed with patient and sent to patient via [employer]  Is the patient currently on any injectable or oral medications for weight loss or diabetes? No  Clearance needed? No  If yes, what clearance is needed? N/A  Clearance has been requested from n/a  The patient has been scheduled for: Colonoscopy  After your procedure, you will be contacted with results. Please confirm the best phone # to reach the patient: [phone]  Family history of colon cancer? No  If yes, indicate relative: n/a  Family History   Problem Relation Age of Onset    Heart disease Maternal Grandmother     Diabetes Maternal Grandmother     Heart disease Maternal Grandfather     Diabetes Maternal Grandfather     Brain cancer Maternal Grandfather     Osteoporosis Other      Past Medical History:   Diagnosis Date    Acid reflux disease     Allergic 2011    Anxiety     Artificial menopause state 12/16/2014    Broken bones 1997    Depression     Hemorrhoids     Night sweats     Osteopenia      Allergies   Allergen Reactions    Escitalopram Hives     Past Surgical History:   Procedure Laterality Date    DENTAL PROCEDURE      EXPLORATORY LAPAROTOMY  2004    ADHESIONS    HYSTERECTOMY  2004    Benign, Endometriosis    OOPHORECTOMY      BSO w hyst     Social History     Socioeconomic History    Marital status:    Tobacco Use    Smoking status: Never    Smokeless tobacco: Never   Vaping Use    Vaping status: Never Used   Substance and Sexual Activity    Alcohol use: Never    Drug use: Never    Sexual activity: Not Currently     Partners: Male     Birth control/protection: Post-menopausal       Current Outpatient Medications:     Calcium Carbonate 1500 (600 Ca) MG tablet, Calcium 600 600 mg calcium (1,500 mg) oral tablet take 2 tablets by oral route daily   Active, Disp: , Rfl:     estradiol (VAGIFEM) 10 MCG tablet vaginal tablet,  Insert 1 tablet into the vagina 2 (Two) Times a Week., Disp: 24 tablet, Rfl: 4    estrogens, conjugated, (Premarin) 0.625 MG tablet, Take 1 tablet by mouth Daily., Disp: 90 tablet, Rfl: 1    multivitamin (THERAGRAN) tablet tablet, Take  by mouth Daily., Disp: , Rfl:     cetirizine (zyrTEC) 10 MG tablet, Take 1 tab the day before RUSH. Take 1 tab  2 hours prior to RUSH. Take 1 tab 2 hours prior to 1st weekly visit., Disp: 10 tablet, Rfl: 1    famotidine (PEPCID) 20 MG tablet, Take 2 tabs the evening before RUSH; Then take 2 tabs  the day of RUSH therapy (Patient taking differently: Not taking yet), Disp: 10 tablet, Rfl: 1    levocetirizine (XYZAL) 5 MG tablet, Take 1 tab the day before RUSH. Take 1 tab 2 hours before RUSH.  Take 1 tab 2 hours prior to 1st weekly visit., Disp: 10 tablet, Rfl: 1    montelukast (SINGULAIR) 10 MG tablet, Take 1 tab the day before RUSH. Take 2 tabs 2 hours prior to RUSH. Take 1 tab  2 hours prior to 1st weekly visit. (Patient taking differently: Not taking yet), Disp: 10 tablet, Rfl: 1    predniSONE (DELTASONE) 20 MG tablet, Take 2 tabs the day before RUSH w/supper. Take 2 tabs by mouth 2 hours prior to RUSH.  Take 1 tab by mouth 2 hours before first weekly visit (Patient taking differently: Not taking yet), Disp: 10 tablet, Rfl: 1

## 2024-06-07 NOTE — PRE-PROCEDURE INSTRUCTIONS
"PAT call attempted. No answer.Detailed message with date and arrival time of 0730 given. Come to entrance \"C\", must have adult  for transportation home;may have two visitors;however, children under 12 must remain in waiting area.Instructed on diet/clear liquids/NPO bowel prep,if needed may take normal meds two hours prior to arrival time except for blood thinners, antidiabetics,diurectics,and weight loss meds.Instructed to return call to confirm receipt of instructions and for any questions.  "

## 2024-06-12 ENCOUNTER — ANESTHESIA EVENT (OUTPATIENT)
Dept: GASTROENTEROLOGY | Facility: HOSPITAL | Age: 44
End: 2024-06-12
Payer: COMMERCIAL

## 2024-06-12 NOTE — ANESTHESIA PREPROCEDURE EVALUATION
Anesthesia Evaluation     Patient summary reviewed and Nursing notes reviewed   NPO Solid Status: > 8 hours  NPO Liquid Status: > 2 hours           Airway   Mallampati: II  TM distance: >3 FB  Neck ROM: full  No difficulty expected  Dental      Pulmonary - normal exam   Cardiovascular - normal exam  Exercise tolerance: excellent (>7 METS)        Neuro/Psych  (+) psychiatric history Anxiety and Depression  GI/Hepatic/Renal/Endo      Musculoskeletal     Abdominal    Substance History      OB/GYN          Other        ROS/Med Hx Other: Screening colonoscopy  No EKG on file                        Anesthesia Plan    ASA 2     general   total IV anesthesia  (Total IV Anesthesia    Patient understands anesthesia not responsible for dental damage.  )  intravenous induction     Anesthetic plan, risks, benefits, and alternatives have been provided, discussed and informed consent has been obtained with: patient.    Plan discussed with CRNA.        CODE STATUS:

## 2024-06-14 ENCOUNTER — ANESTHESIA (OUTPATIENT)
Dept: GASTROENTEROLOGY | Facility: HOSPITAL | Age: 44
End: 2024-06-14
Payer: COMMERCIAL

## 2024-06-14 ENCOUNTER — HOSPITAL ENCOUNTER (OUTPATIENT)
Facility: HOSPITAL | Age: 44
Setting detail: HOSPITAL OUTPATIENT SURGERY
Discharge: HOME | End: 2024-06-14
Attending: INTERNAL MEDICINE | Admitting: INTERNAL MEDICINE
Payer: COMMERCIAL

## 2024-06-14 ENCOUNTER — TELEPHONE (OUTPATIENT)
Dept: GASTROENTEROLOGY | Facility: CLINIC | Age: 44
End: 2024-06-14
Payer: COMMERCIAL

## 2024-06-14 VITALS
SYSTOLIC BLOOD PRESSURE: 96 MMHG | TEMPERATURE: 97.6 F | WEIGHT: 146.39 LBS | HEART RATE: 69 BPM | DIASTOLIC BLOOD PRESSURE: 60 MMHG | BODY MASS INDEX: 24.36 KG/M2 | RESPIRATION RATE: 18 BRPM | OXYGEN SATURATION: 98 %

## 2024-06-14 PROCEDURE — 25010000002 PROPOFOL 10 MG/ML EMULSION: Performed by: NURSE ANESTHETIST, CERTIFIED REGISTERED

## 2024-06-14 PROCEDURE — 45378 DIAGNOSTIC COLONOSCOPY: CPT | Performed by: INTERNAL MEDICINE

## 2024-06-14 RX ORDER — PROPOFOL 10 MG/ML
VIAL (ML) INTRAVENOUS AS NEEDED
Status: DISCONTINUED | OUTPATIENT
Start: 2024-06-14 | End: 2024-06-14 | Stop reason: SURG

## 2024-06-14 RX ORDER — LIDOCAINE HYDROCHLORIDE 20 MG/ML
INJECTION, SOLUTION EPIDURAL; INFILTRATION; INTRACAUDAL; PERINEURAL AS NEEDED
Status: DISCONTINUED | OUTPATIENT
Start: 2024-06-14 | End: 2024-06-14 | Stop reason: SURG

## 2024-06-14 RX ORDER — SODIUM CHLORIDE, SODIUM LACTATE, POTASSIUM CHLORIDE, CALCIUM CHLORIDE 600; 310; 30; 20 MG/100ML; MG/100ML; MG/100ML; MG/100ML
30 INJECTION, SOLUTION INTRAVENOUS CONTINUOUS
Status: DISCONTINUED | OUTPATIENT
Start: 2024-06-14 | End: 2024-06-14 | Stop reason: HOSPADM

## 2024-06-14 RX ADMIN — LIDOCAINE HYDROCHLORIDE 100 MG: 20 INJECTION, SOLUTION INTRAVENOUS at 08:30

## 2024-06-14 RX ADMIN — PROPOFOL 50 MG: 10 INJECTION, EMULSION INTRAVENOUS at 08:30

## 2024-06-14 RX ADMIN — PROPOFOL 300 MCG/KG/MIN: 10 INJECTION, EMULSION INTRAVENOUS at 08:31

## 2024-06-14 NOTE — H&P
Pre Procedure History & Physical    Chief Complaint:   Screening colonoscopy    Subjective     HPI:   44 yo F here for screening colonoscopy.    Past Medical History:   Past Medical History:   Diagnosis Date    Acid reflux disease     Allergic 2011    Anxiety     Artificial menopause state 12/16/2014    Broken bones 1997    Depression     Hemorrhoids     Night sweats     Osteopenia        Past Surgical History:  Past Surgical History:   Procedure Laterality Date    DENTAL PROCEDURE      EXPLORATORY LAPAROTOMY  2004    ADHESIONS    HYSTERECTOMY  2004    Benign, Endometriosis    OOPHORECTOMY      BSO w hyst       Family History:  Family History   Problem Relation Age of Onset    Heart disease Maternal Grandmother     Diabetes Maternal Grandmother     Heart disease Maternal Grandfather     Diabetes Maternal Grandfather     Brain cancer Maternal Grandfather     Osteoporosis Other        Social History:   reports that she has never smoked. She has never used smokeless tobacco. She reports that she does not drink alcohol and does not use drugs.    Medications:   Medications Prior to Admission   Medication Sig Dispense Refill Last Dose    Calcium Carbonate 1500 (600 Ca) MG tablet Calcium 600 600 mg calcium (1,500 mg) oral tablet take 2 tablets by oral route daily   Active       cetirizine (zyrTEC) 10 MG tablet Take 1 tab the day before RUSH. Take 1 tab  2 hours prior to RUSH. Take 1 tab 2 hours prior to 1st weekly visit. 10 tablet 1     estradiol (VAGIFEM) 10 MCG tablet vaginal tablet Insert 1 tablet into the vagina 2 (Two) Times a Week. 24 tablet 4     estrogens, conjugated, (Premarin) 0.625 MG tablet Take 1 tablet by mouth Daily. 90 tablet 1     famotidine (PEPCID) 20 MG tablet Take 2 tabs the evening before RUSH; Then take 2 tabs  the day of RUSH therapy (Patient taking differently: Not taking yet) 10 tablet 1     levocetirizine (XYZAL) 5 MG tablet Take 1 tab the day before RUSH. Take 1 tab 2 hours before RUSH.  Take 1  tab 2 hours prior to 1st weekly visit. 10 tablet 1     montelukast (SINGULAIR) 10 MG tablet Take 1 tab the day before RUSH. Take 2 tabs 2 hours prior to RUSH. Take 1 tab  2 hours prior to 1st weekly visit. (Patient taking differently: Not taking yet) 10 tablet 1     multivitamin (THERAGRAN) tablet tablet Take  by mouth Daily.       predniSONE (DELTASONE) 20 MG tablet Take 2 tabs the day before RUSH w/supper. Take 2 tabs by mouth 2 hours prior to RUSH.  Take 1 tab by mouth 2 hours before first weekly visit (Patient taking differently: Not taking yet) 10 tablet 1     sodium-potassium-magnesium sulfates (SUPREP) 17.5-3.13-1.6 GM/177ML solution oral solution Take as directed 354 mL 0        Allergies:  Escitalopram    ROS:    Pertinent items are noted in HPI     Objective     Weight 66.4 kg (146 lb 6.2 oz), not currently breastfeeding.    Physical Exam   Constitutional: Pt is oriented to person, place, and time and well-developed, well-nourished, and in no distress.   Mouth/Throat: Oropharynx is clear and moist.   Neck: Normal range of motion.   Cardiovascular: Normal rate, regular rhythm and normal heart sounds.    Pulmonary/Chest: Effort normal and breath sounds normal.   Abdominal: Soft. Nontender  Skin: Skin is warm and dry.   Psychiatric: Mood, memory, affect and judgment normal.     Assessment & Plan     Diagnosis:  Screening colonoscopy    Anticipated Surgical Procedure:  Colonoscopy    The risks, benefits, and alternatives of this procedure have been discussed with the patient or the responsible party- the patient understands and agrees to proceed.

## 2024-06-14 NOTE — ANESTHESIA POSTPROCEDURE EVALUATION
Patient: Violeta Gaona    Procedure Summary       Date: 06/14/24 Room / Location: [facility] ENDOSCOPY 1 / [facility] ENDOSCOPY    Anesthesia Start: 0828 Anesthesia Stop: 0853    Procedure: COLONOSCOPY Diagnosis:       Colon cancer screening      (Colon cancer screening [Z12.11])    Surgeons: Aydee Ibrahmi MD Provider: Li Barroso CRNA    Anesthesia Type: general ASA Status: 2            Anesthesia Type: general    Vitals  Vitals Value Taken Time   BP 96/60 06/14/24 0906   Temp 36.4 °C (97.6 °F) 06/14/24 0850   Pulse 71 06/14/24 0906   Resp 18 06/14/24 0905   SpO2 99 % 06/14/24 0906   Vitals shown include unfiled device data.        Post Anesthesia Care and Evaluation    Patient location during evaluation: bedside  Patient participation: complete - patient participated  Level of consciousness: awake  Pain management: adequate    Airway patency: patent  Anesthetic complications: No anesthetic complications  PONV Status: controlled  Cardiovascular status: acceptable and stable  Respiratory status: acceptable

## 2024-07-04 ENCOUNTER — APPOINTMENT (OUTPATIENT)
Dept: GENERAL RADIOLOGY | Facility: HOSPITAL | Age: 44
End: 2024-07-04
Payer: COMMERCIAL

## 2024-07-04 ENCOUNTER — HOSPITAL ENCOUNTER (EMERGENCY)
Facility: HOSPITAL | Age: 44
Discharge: HOME | End: 2024-07-04
Attending: EMERGENCY MEDICINE
Payer: COMMERCIAL

## 2024-07-04 VITALS
HEIGHT: 65 IN | SYSTOLIC BLOOD PRESSURE: 117 MMHG | RESPIRATION RATE: 16 BRPM | TEMPERATURE: 98.8 F | OXYGEN SATURATION: 99 % | DIASTOLIC BLOOD PRESSURE: 63 MMHG | BODY MASS INDEX: 22.85 KG/M2 | WEIGHT: 137.13 LBS | HEART RATE: 74 BPM
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
## 2024-07-04 DIAGNOSIS — S20.219A CONTUSION OF CHEST WALL, INITIAL ENCOUNTER: ICD-10-CM

## 2024-07-04 DIAGNOSIS — S80.02XA CONTUSION OF LEFT KNEE, INITIAL ENCOUNTER: ICD-10-CM

## 2024-07-04 PROCEDURE — 99283 EMERGENCY DEPT VISIT LOW MDM: CPT

## 2024-07-04 PROCEDURE — 71120 X-RAY EXAM BREASTBONE 2/>VWS: CPT

## 2024-07-04 PROCEDURE — 73562 X-RAY EXAM OF KNEE 3: CPT

## 2024-07-04 RX ORDER — IBUPROFEN 600 MG/1
600 TABLET ORAL ONCE
Status: COMPLETED | OUTPATIENT
Start: 2024-07-04 | End: 2024-07-04

## 2024-07-04 RX ADMIN — IBUPROFEN 600 MG: 600 TABLET, FILM COATED ORAL at 19:51
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
## 2024-07-05 NOTE — DISCHARGE INSTRUCTIONS
Rest, ice, and elevate.  Use the Ace wrap to help with swelling and comfort.  You may take over-the-counter acetaminophen and Motrin as needed for aches and pains.  Follow-up with Dr. Finnegan's office next week for reevaluation and further treatment as necessary.  Return to the emergency department immediately for any acutely worsening swelling, any increase in pain, any inability to flex or extend your knee, any respiratory distress or shortness of air or any new or worse concerns.

## 2024-07-10 ENCOUNTER — OFFICE VISIT (OUTPATIENT)
Dept: INTERNAL MEDICINE | Facility: CLINIC | Age: 44
End: 2024-07-10
Payer: COMMERCIAL

## 2024-07-10 VITALS
WEIGHT: 149.4 LBS | RESPIRATION RATE: 20 BRPM | SYSTOLIC BLOOD PRESSURE: 122 MMHG | BODY MASS INDEX: 24.89 KG/M2 | OXYGEN SATURATION: 99 % | HEART RATE: 79 BPM | HEIGHT: 65 IN | TEMPERATURE: 97.6 F | DIASTOLIC BLOOD PRESSURE: 82 MMHG

## 2024-07-10 DIAGNOSIS — F41.9 ANXIETY: ICD-10-CM

## 2024-07-10 DIAGNOSIS — R41.840 CONCENTRATION DEFICIT: Primary | ICD-10-CM

## 2024-07-10 DIAGNOSIS — S60.521D: ICD-10-CM

## 2024-07-10 DIAGNOSIS — M25.40 JOINT SWELLING: ICD-10-CM

## 2024-07-10 LAB
ALBUMIN SERPL-MCNC: 4.7 G/DL (ref 3.5–5.2)
ALBUMIN/GLOB SERPL: 1.3 G/DL
ALP SERPL-CCNC: 117 U/L (ref 39–117)
ALT SERPL W P-5'-P-CCNC: 10 U/L (ref 1–33)
ANION GAP SERPL CALCULATED.3IONS-SCNC: 8.8 MMOL/L (ref 5–15)
AST SERPL-CCNC: 25 U/L (ref 1–32)
BASOPHILS # BLD AUTO: 0.05 10*3/MM3 (ref 0–0.2)
BASOPHILS NFR BLD AUTO: 0.8 % (ref 0–1.5)
BILIRUB SERPL-MCNC: 0.4 MG/DL (ref 0–1.2)
BUN SERPL-MCNC: 12 MG/DL (ref 6–20)
BUN/CREAT SERPL: 20.7 (ref 7–25)
CALCIUM SPEC-SCNC: 9.7 MG/DL (ref 8.6–10.5)
CHLORIDE SERPL-SCNC: 106 MMOL/L (ref 98–107)
CHROMATIN AB SERPL-ACNC: <10 IU/ML (ref 0–14)
CO2 SERPL-SCNC: 26.2 MMOL/L (ref 22–29)
CREAT SERPL-MCNC: 0.58 MG/DL (ref 0.57–1)
DEPRECATED RDW RBC AUTO: 41.6 FL (ref 37–54)
EGFRCR SERPLBLD CKD-EPI 2021: 113.2 ML/MIN/1.73
EOSINOPHIL # BLD AUTO: 0.09 10*3/MM3 (ref 0–0.4)
EOSINOPHIL NFR BLD AUTO: 1.5 % (ref 0.3–6.2)
ERYTHROCYTE [DISTWIDTH] IN BLOOD BY AUTOMATED COUNT: 12.4 % (ref 12.3–15.4)
ERYTHROCYTE [SEDIMENTATION RATE] IN BLOOD: 6 MM/HR (ref 0–20)
GLOBULIN UR ELPH-MCNC: 3.5 GM/DL
GLUCOSE SERPL-MCNC: 101 MG/DL (ref 65–99)
HCT VFR BLD AUTO: 39.1 % (ref 34–46.6)
HGB BLD-MCNC: 13.3 G/DL (ref 12–15.9)
IMM GRANULOCYTES # BLD AUTO: 0.01 10*3/MM3 (ref 0–0.05)
IMM GRANULOCYTES NFR BLD AUTO: 0.2 % (ref 0–0.5)
LYMPHOCYTES # BLD AUTO: 1.79 10*3/MM3 (ref 0.7–3.1)
LYMPHOCYTES NFR BLD AUTO: 30.1 % (ref 19.6–45.3)
MCH RBC QN AUTO: 31.1 PG (ref 26.6–33)
MCHC RBC AUTO-ENTMCNC: 34 G/DL (ref 31.5–35.7)
MCV RBC AUTO: 91.6 FL (ref 79–97)
MONOCYTES # BLD AUTO: 0.3 10*3/MM3 (ref 0.1–0.9)
MONOCYTES NFR BLD AUTO: 5.1 % (ref 5–12)
NEUTROPHILS NFR BLD AUTO: 3.7 10*3/MM3 (ref 1.7–7)
NEUTROPHILS NFR BLD AUTO: 62.3 % (ref 42.7–76)
NRBC BLD AUTO-RTO: 0 /100 WBC (ref 0–0.2)
PLATELET # BLD AUTO: 285 10*3/MM3 (ref 140–450)
PMV BLD AUTO: 10.8 FL (ref 6–12)
POTASSIUM SERPL-SCNC: 4.4 MMOL/L (ref 3.5–5.2)
PROT SERPL-MCNC: 8.2 G/DL (ref 6–8.5)
RBC # BLD AUTO: 4.27 10*6/MM3 (ref 3.77–5.28)
SODIUM SERPL-SCNC: 141 MMOL/L (ref 136–145)
TSH SERPL DL<=0.05 MIU/L-ACNC: 2.09 UIU/ML (ref 0.27–4.2)
WBC NRBC COR # BLD AUTO: 5.94 10*3/MM3 (ref 3.4–10.8)

## 2024-07-10 PROCEDURE — 99214 OFFICE O/P EST MOD 30 MIN: CPT | Performed by: INTERNAL MEDICINE

## 2024-07-10 PROCEDURE — 85652 RBC SED RATE AUTOMATED: CPT | Performed by: INTERNAL MEDICINE

## 2024-07-10 PROCEDURE — 86431 RHEUMATOID FACTOR QUANT: CPT | Performed by: INTERNAL MEDICINE

## 2024-07-10 PROCEDURE — 80050 GENERAL HEALTH PANEL: CPT | Performed by: INTERNAL MEDICINE

## 2024-07-10 RX ORDER — VALACYCLOVIR HYDROCHLORIDE 500 MG/1
500 TABLET, FILM COATED ORAL 2 TIMES DAILY
Qty: 90 TABLET | Refills: 1 | Status: SHIPPED | OUTPATIENT
Start: 2024-07-10

## 2024-07-10 RX ORDER — TRIAMCINOLONE ACETONIDE 5 MG/G
1 OINTMENT TOPICAL 2 TIMES DAILY
Qty: 15 G | Refills: 1 | Status: SHIPPED | OUTPATIENT
Start: 2024-07-10
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
## 2024-07-12 ENCOUNTER — OFFICE VISIT (OUTPATIENT)
Dept: INTERNAL MEDICINE | Facility: CLINIC | Age: 44
End: 2024-07-12
Payer: COMMERCIAL

## 2024-07-12 VITALS
HEIGHT: 65 IN | RESPIRATION RATE: 18 BRPM | SYSTOLIC BLOOD PRESSURE: 98 MMHG | HEART RATE: 74 BPM | TEMPERATURE: 97.4 F | WEIGHT: 148.6 LBS | OXYGEN SATURATION: 100 % | BODY MASS INDEX: 24.76 KG/M2 | DIASTOLIC BLOOD PRESSURE: 62 MMHG

## 2024-07-12 DIAGNOSIS — S80.02XD CONTUSION OF LEFT KNEE, SUBSEQUENT ENCOUNTER: ICD-10-CM
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
## 2024-07-12 DIAGNOSIS — S20.219D CONTUSION OF CHEST WALL, UNSPECIFIED LATERALITY, SUBSEQUENT ENCOUNTER: ICD-10-CM

## 2024-07-12 PROCEDURE — 99213 OFFICE O/P EST LOW 20 MIN: CPT | Performed by: INTERNAL MEDICINE

## 2024-07-14 ENCOUNTER — PATIENT MESSAGE (OUTPATIENT)
Dept: INTERNAL MEDICINE | Facility: CLINIC | Age: 44
End: 2024-07-14
Payer: COMMERCIAL

## 2024-07-14 DIAGNOSIS — N95.8 ARTIFICIAL MENOPAUSE STATE: ICD-10-CM

## 2024-07-16 NOTE — TELEPHONE ENCOUNTER
From: Violeta Gaona  To: Radha Finnegan  Sent: 7/14/2024 10:20 AM EDT  Subject: Steroid topical result    Please let Dr. Finnegan know she was right, the topical steroid made the rash on my hand worse.

## 2024-07-18 ENCOUNTER — TELEPHONE (OUTPATIENT)
Dept: INTERNAL MEDICINE | Facility: CLINIC | Age: 44
End: 2024-07-18
Payer: COMMERCIAL

## 2024-07-18 DIAGNOSIS — M25.562 ACUTE PAIN OF LEFT KNEE: Primary | ICD-10-CM
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
## 2024-07-19 NOTE — TELEPHONE ENCOUNTER
Spoke with patient rely message, pt stated who ever can see her the soonest would be ok, please advise
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
## 2024-08-14 ENCOUNTER — OFFICE VISIT (OUTPATIENT)
Dept: ORTHOPEDIC SURGERY | Facility: CLINIC | Age: 44
End: 2024-08-14
Payer: COMMERCIAL

## 2024-08-14 VITALS
SYSTOLIC BLOOD PRESSURE: 131 MMHG | OXYGEN SATURATION: 97 % | WEIGHT: 147 LBS | DIASTOLIC BLOOD PRESSURE: 81 MMHG | HEART RATE: 68 BPM | HEIGHT: 65 IN | BODY MASS INDEX: 24.49 KG/M2

## 2024-08-14 DIAGNOSIS — S89.92XA LEFT KNEE INJURY, INITIAL ENCOUNTER: ICD-10-CM

## 2024-08-14 DIAGNOSIS — S80.02XA TRAUMATIC HEMATOMA OF LEFT KNEE, INITIAL ENCOUNTER: ICD-10-CM

## 2024-08-14 DIAGNOSIS — M25.562 LEFT KNEE PAIN, UNSPECIFIED CHRONICITY: Primary | ICD-10-CM
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
## 2024-08-22 ENCOUNTER — TELEPHONE (OUTPATIENT)
Dept: INTERNAL MEDICINE | Facility: CLINIC | Age: 44
End: 2024-08-22
Payer: COMMERCIAL

## 2024-08-22 NOTE — TELEPHONE ENCOUNTER
LVM for pt that Dr. Finnegan will be out of the office 01-03-25 ok for HUB to relay and re-garrick

## 2024-10-16 ENCOUNTER — OFFICE VISIT (OUTPATIENT)
Dept: INTERNAL MEDICINE | Facility: CLINIC | Age: 44
End: 2024-10-16
Payer: COMMERCIAL

## 2024-10-16 VITALS
WEIGHT: 148.2 LBS | BODY MASS INDEX: 24.69 KG/M2 | DIASTOLIC BLOOD PRESSURE: 70 MMHG | HEART RATE: 67 BPM | HEIGHT: 65 IN | RESPIRATION RATE: 20 BRPM | SYSTOLIC BLOOD PRESSURE: 124 MMHG | TEMPERATURE: 97.6 F | OXYGEN SATURATION: 98 %

## 2024-10-16 DIAGNOSIS — Z78.0 POSTMENOPAUSE: ICD-10-CM

## 2024-10-16 DIAGNOSIS — B00.89 HERPETIC WHITLOW: ICD-10-CM

## 2024-10-16 DIAGNOSIS — M25.569 CHRONIC KNEE PAIN, UNSPECIFIED LATERALITY: ICD-10-CM

## 2024-10-16 DIAGNOSIS — F41.9 ANXIETY: ICD-10-CM

## 2024-10-16 DIAGNOSIS — G89.29 CHRONIC KNEE PAIN, UNSPECIFIED LATERALITY: ICD-10-CM

## 2024-10-16 DIAGNOSIS — M85.80 OSTEOPENIA, UNSPECIFIED LOCATION: Primary | ICD-10-CM

## 2024-10-16 DIAGNOSIS — R32 URINARY INCONTINENCE, UNSPECIFIED TYPE: ICD-10-CM

## 2024-10-16 PROCEDURE — 99214 OFFICE O/P EST MOD 30 MIN: CPT | Performed by: INTERNAL MEDICINE

## 2024-10-16 NOTE — PROGRESS NOTES
"Chief Complaint  Anxiety (3 mo f/u )      Subjective      History of Present Illness  The patient presents for evaluation of multiple medical concerns.    She reports significant improvement in her condition. The valacyclovir appears to be effective, as she has not experienced any outbreaks. She is currently taking it daily as a preventive measure.    She sought help for her knee pain, but she did not find the treatment beneficial. After waiting for 4 weeks without seeing any progress and experiencing difficulty in movement, she returned to the clinic. She was offered physical therapy, which she found helpful. Her knee function has improved, but she still experiences nerve pain. A neurospecialist informed her that this could be permanent, but there is hope for improvement over time. She completed a 5K walk recently, which caused some pain, but she managed it with ice application. She no longer takes Advil as the swelling has subsided.     Her stress levels have decreased, but her concentration remains poor. She is working with [facility] and [facility]lin and they suggested Cognitive Behavioral Therapy (CBT) as a potential solution. She is scheduled for testing tomorrow. She is open to medication if necessary, but prefers to manage her symptoms with CBT due to her knowledge of potential side effects.    She believes the estrogen treatment is working well for her. She had issues with incontinence when sneezing, but this has improved significantly.    IMMUNIZATIONS  She is up-to-date on her influenza and COVID-19 vaccines.         Objective   Vital Signs:   Vitals:    10/16/24 1603   BP: 124/70   BP Location: Left arm   Patient Position: Sitting   Cuff Size: Adult   Pulse: 67   Resp: 20   Temp: 97.6 °F (36.4 °C)   TempSrc: Temporal   SpO2: 98%   Weight: 67.2 kg (148 lb 3.2 oz)   Height: 165.1 cm (65\")     Body mass index is 24.66 kg/m².    Wt Readings from Last 3 Encounters:   10/16/24 67.2 kg (148 lb 3.2 oz)   08/14/24 " 66.7 kg (147 lb)   07/12/24 67.4 kg (148 lb 9.6 oz)     BP Readings from Last 3 Encounters:   10/16/24 124/70   08/14/24 131/81   07/12/24 98/62       Health Maintenance   Topic Date Due    DXA SCAN  09/21/2024    MAMMOGRAM  10/26/2024    ANNUAL PHYSICAL  12/20/2024    TDAP/TD VACCINES (2 - Td or Tdap) 01/30/2028    COLORECTAL CANCER SCREENING  06/14/2034    HEPATITIS C SCREENING  Completed    COVID-19 Vaccine  Completed    INFLUENZA VACCINE  Completed    Pneumococcal Vaccine 0-64  Aged Out       Physical Exam  Vitals reviewed.   Constitutional:       Appearance: Normal appearance. She is well-developed.   HENT:      Head: Normocephalic and atraumatic.      Right Ear: External ear normal.      Left Ear: External ear normal.   Eyes:      Conjunctiva/sclera: Conjunctivae normal.      Pupils: Pupils are equal, round, and reactive to light.   Cardiovascular:      Rate and Rhythm: Normal rate and regular rhythm.      Heart sounds: No murmur heard.     No friction rub. No gallop.   Pulmonary:      Effort: Pulmonary effort is normal.      Breath sounds: Normal breath sounds. No wheezing or rhonchi.   Skin:     General: Skin is warm and dry.   Neurological:      Mental Status: She is alert and oriented to person, place, and time.   Psychiatric:         Mood and Affect: Affect normal.         Behavior: Behavior normal.         Thought Content: Thought content normal.        Physical Exam        Result Review :  The following data was reviewed by: Radha Finnegan MD on 10/16/2024:         Results      BMI is within normal parameters. No other follow-up for BMI required.       Procedures            Assessment & Plan  Postmenopause    Osteopenia, unspecified location    Herpetic lazaro    Chronic knee pain, unspecified laterality    Anxiety    Urinary incontinence, unspecified type      Orders Placed This Encounter   Procedures    DEXA Bone Density Axial             Assessment & Plan  1. Herpetic Lazaro.  She reports no  outbreaks and is taking valacyclovir daily for prevention. Continuation of valacyclovir is advised.    2. Knee Pain.  She has been undergoing physical therapy (PT) at [facility], which has improved her knee function. She completed a 5K walk, experiencing some pain but managing with ice. Continuation of PT is recommended.    3. Nerve Pain.  She still experiences nerve pain, which a neurospecialist indicated could be permanent but might improve over time. Ice application provides some relief. A DEXA scan will be ordered to further evaluate her condition.    4. Anxiety and Concentration Issues.  She is scheduled for neuropsychological testing tomorrow and has been advised to proceed with cognitive behavioral therapy (CBT). Medication may be considered based on the test results and the effectiveness of CBT.    5. Urinary Incontinence.  She reports improvement in incontinence symptoms with estrogen therapy. Continuation of estrogen is recommended. Pelvic floor therapy and the use of an intravaginal device covered by insurance may also be beneficial.    6. Health Maintenance.  She has received her flu and COVID vaccines. A DEXA scan will be ordered.    Follow-up  Return in 3 months for follow up.    Patient or patient representative verbalized consent for the use of Ambient Listening during the visit with  Radha Finnegan MD for chart documentation. 10/22/2024  16:48 EDT      FOLLOW UP  Return in about 3 months (around 1/16/2025).  Patient was given instructions and counseling regarding her condition or for health maintenance advice. Please see specific information pulled into the AVS if appropriate.     Radha Finnegan MD  10/22/24  16:01 EDT    CURRENT & DISCONTINUED MEDICATIONS  Current Outpatient Medications   Medication Instructions    Calcium Carbonate 1500 (600 Ca) MG tablet Calcium 600 600 mg calcium (1,500 mg) oral tablet take 2 tablets by oral route daily   Active    estradiol (VAGIFEM) 10 mcg, Vaginal,  2 Times Weekly    multivitamin (THERAGRAN) tablet tablet Daily    Premarin 0.625 mg, Oral, Daily    valACYclovir (VALTREX) 500 mg, Oral, 2 Times Daily       Medications Discontinued During This Encounter   Medication Reason    triamcinolone (KENALOG) 0.5 % ointment

## 2024-10-18 RX ORDER — VALACYCLOVIR HYDROCHLORIDE 500 MG/1
500 TABLET, FILM COATED ORAL 2 TIMES DAILY
Qty: 90 TABLET | Refills: 1 | Status: SHIPPED | OUTPATIENT
Start: 2024-10-18

## 2024-10-29 DIAGNOSIS — N95.8 ARTIFICIAL MENOPAUSE STATE: ICD-10-CM

## 2024-11-13 ENCOUNTER — PATIENT MESSAGE (OUTPATIENT)
Dept: INTERNAL MEDICINE | Facility: CLINIC | Age: 44
End: 2024-11-13
Payer: COMMERCIAL

## 2024-11-13 RX ORDER — ATOMOXETINE 25 MG/1
25 CAPSULE ORAL DAILY
Qty: 90 CAPSULE | Refills: 1 | Status: SHIPPED | OUTPATIENT
Start: 2024-11-13

## 2024-12-13 ENCOUNTER — HOSPITAL ENCOUNTER (OUTPATIENT)
Dept: BONE DENSITY | Facility: HOSPITAL | Age: 44
Discharge: HOME | End: 2024-12-13
Admitting: INTERNAL MEDICINE
Payer: COMMERCIAL

## 2024-12-13 DIAGNOSIS — Z78.0 POSTMENOPAUSE: ICD-10-CM

## 2024-12-13 PROCEDURE — 77080 DXA BONE DENSITY AXIAL: CPT

## 2025-01-13 RX ORDER — VALACYCLOVIR HYDROCHLORIDE 500 MG/1
500 TABLET, FILM COATED ORAL 2 TIMES DAILY
Qty: 90 TABLET | Refills: 1 | Status: SHIPPED | OUTPATIENT
Start: 2025-01-13

## 2025-01-24 ENCOUNTER — TELEMEDICINE (OUTPATIENT)
Dept: INTERNAL MEDICINE | Facility: CLINIC | Age: 45
End: 2025-01-24
Payer: COMMERCIAL

## 2025-01-24 DIAGNOSIS — Z12.31 ENCOUNTER FOR SCREENING MAMMOGRAM FOR MALIGNANT NEOPLASM OF BREAST: Primary | ICD-10-CM

## 2025-01-24 DIAGNOSIS — R41.840 CONCENTRATION DEFICIT: ICD-10-CM

## 2025-01-24 DIAGNOSIS — F41.9 ANXIETY: ICD-10-CM

## 2025-01-24 DIAGNOSIS — N95.8 ARTIFICIAL MENOPAUSE STATE: ICD-10-CM

## 2025-01-24 PROCEDURE — 99214 OFFICE O/P EST MOD 30 MIN: CPT | Performed by: INTERNAL MEDICINE

## 2025-01-24 NOTE — PROGRESS NOTES
TELEHEALTH VISIT  Mode of Visit: Video  Location of patient: home  You have chosen to receive care through a telehealth visit.  Patient understanding that this is a telehealth visit.  I cannot perform a full physical exam, therefore something might be missed, or patient may need to come into the office for further evaluation.  Patient is understanding and consents to this type of visit.  The visit included audio and video interaction.     Chief Complaint  Anxiety (3 mo f/u )    Subjective          Violeta Gaona presents to [facility]  History of Present Illness  The patient presents via virtual visit for ADHD, estrogen management, and blood pressure management.    She reports improved stress and anxiety due to new employment and Strattera, but experiences significant nausea. Eating breakfast before medication provides some relief. She is satisfied with Strattera's benefits despite the nausea.    She continues vaginal estrogen and Premarin, effectively managing symptoms without leakage. Reduced Premarin dosage has alleviated sideeffects.    Her home-monitored blood pressure remains low, with the highest reading at 120/62. No elevation since starting current medication.    She is doing well with no complaints. Needs to schedule a mammogram. No chest pain, trouble breathing, or chest lumps. No family history of breast cancer.           Objective   Physical Exam   Constitutional: She appears well-developed and well-nourished.   HENT:   Head: Normocephalic and atraumatic.   Nose: Nose normal.   Eyes: Pupils are equal, round, and reactive to light. EOM are normal.   Neck: Neck normal appearance.  Pulmonary/Chest: Effort normal.   Neurological: She is alert.   Psychiatric: She has a normal mood and affect.     Result Review :       Common labs          7/10/2024    13:19   Common Labs   Glucose 101    BUN 12    Creatinine 0.58    Sodium 141    Potassium 4.4    Chloride 106     Calcium 9.7    Albumin 4.7    Total Bilirubin 0.4    Alkaline Phosphatase 117    AST (SGOT) 25    ALT (SGPT) 10    WBC 5.94    Hemoglobin 13.3    Hematocrit 39.1    Platelets 285        Results for orders placed or performed in visit on 07/10/24   Rheumatoid Factor    Collection Time: 07/10/24  1:19 PM    Specimen: Arm, Left; Blood   Result Value Ref Range    Rheumatoid Factor Quantitative <10.0 0.0 - 14.0 IU/mL   Comprehensive Metabolic Panel    Collection Time: 07/10/24  1:19 PM    Specimen: Arm, Left; Blood   Result Value Ref Range    Glucose 101 (H) 65 - 99 mg/dL    BUN 12 6 - 20 mg/dL    Creatinine 0.58 0.57 - 1.00 mg/dL    Sodium 141 136 - 145 mmol/L    Potassium 4.4 3.5 - 5.2 mmol/L    Chloride 106 98 - 107 mmol/L    CO2 26.2 22.0 - 29.0 mmol/L    Calcium 9.7 8.6 - 10.5 mg/dL    Total Protein 8.2 6.0 - 8.5 g/dL    Albumin 4.7 3.5 - 5.2 g/dL    ALT (SGPT) 10 1 - 33 U/L    AST (SGOT) 25 1 - 32 U/L    Alkaline Phosphatase 117 39 - 117 U/L    Total Bilirubin 0.4 0.0 - 1.2 mg/dL    Globulin 3.5 gm/dL    A/G Ratio 1.3 g/dL    BUN/Creatinine Ratio 20.7 7.0 - 25.0    Anion Gap 8.8 5.0 - 15.0 mmol/L    eGFR 113.2 >60.0 mL/min/1.73   TSH    Collection Time: 07/10/24  1:19 PM    Specimen: Arm, Left; Blood   Result Value Ref Range    TSH 2.090 0.270 - 4.200 uIU/mL   Sedimentation Rate    Collection Time: 07/10/24  1:19 PM    Specimen: Arm, Left; Blood   Result Value Ref Range    Sed Rate 6 0 - 20 mm/hr   CBC Auto Differential    Collection Time: 07/10/24  1:19 PM    Specimen: Arm, Left; Blood   Result Value Ref Range    WBC 5.94 3.40 - 10.80 10*3/mm3    RBC 4.27 3.77 - 5.28 10*6/mm3    Hemoglobin 13.3 12.0 - 15.9 g/dL    Hematocrit 39.1 34.0 - 46.6 %    MCV 91.6 79.0 - 97.0 fL    MCH 31.1 26.6 - 33.0 pg    MCHC 34.0 31.5 - 35.7 g/dL    RDW 12.4 12.3 - 15.4 %    RDW-SD 41.6 37.0 - 54.0 fl    MPV 10.8 6.0 - 12.0 fL    Platelets 285 140 - 450 10*3/mm3    Neutrophil % 62.3 42.7 - 76.0 %    Lymphocyte % 30.1 19.6 - 45.3 %     Monocyte % 5.1 5.0 - 12.0 %    Eosinophil % 1.5 0.3 - 6.2 %    Basophil % 0.8 0.0 - 1.5 %    Immature Grans % 0.2 0.0 - 0.5 %    Neutrophils, Absolute 3.70 1.70 - 7.00 10*3/mm3    Lymphocytes, Absolute 1.79 0.70 - 3.10 10*3/mm3    Monocytes, Absolute 0.30 0.10 - 0.90 10*3/mm3    Eosinophils, Absolute 0.09 0.00 - 0.40 10*3/mm3    Basophils, Absolute 0.05 0.00 - 0.20 10*3/mm3    Immature Grans, Absolute 0.01 0.00 - 0.05 10*3/mm3    nRBC 0.0 0.0 - 0.2 /100 WBC            Procedures        Assessment and Plan    Diagnoses and all orders for this visit:    1. Encounter for screening mammogram for malignant neoplasm of breast (Primary)  -     Mammo Screening Digital Tomosynthesis Bilateral With CAD; Future    2. Artificial menopause state    3. Anxiety    4. Concentration deficit      Assessment & Plan  Concentration deficit  - Significant improvement in stress and anxiety with Strattera  - Nausea persists  - Advised to adjust medication timing with meals  - If nausea continues, consider Qelbree 100 mg, potentially increasing to 200 mg  - Will review Qelbree information and notify via [facility] if proceeding    Estrogen management:  - Leakage significantly improved  - Improved symptoms with reduced Premarin dose  - Prefers current dosage of Premarin and vaginal estrogen    Blood pressure management:  - Stable  - Highest reading 120/62 mmHg since starting medication    Health maintenance:  - Mammogram scheduled    Follow-up in 6 months                Follow Up   Return in about 6 months (around 7/24/2025).  Patient was given instructions and counseling regarding her condition or for health maintenance advice. Please see specific information pulled into the AVS if appropriate.

## 2025-02-12 ENCOUNTER — HOSPITAL ENCOUNTER (OUTPATIENT)
Dept: MAMMOGRAPHY | Facility: HOSPITAL | Age: 45
Discharge: HOME | End: 2025-02-12
Admitting: INTERNAL MEDICINE
Payer: COMMERCIAL

## 2025-02-12 DIAGNOSIS — Z12.31 ENCOUNTER FOR SCREENING MAMMOGRAM FOR MALIGNANT NEOPLASM OF BREAST: ICD-10-CM

## 2025-02-12 PROCEDURE — 77063 BREAST TOMOSYNTHESIS BI: CPT

## 2025-02-12 PROCEDURE — 77067 SCR MAMMO BI INCL CAD: CPT

## 2025-02-25 ENCOUNTER — PATIENT MESSAGE (OUTPATIENT)
Dept: INTERNAL MEDICINE | Facility: CLINIC | Age: 45
End: 2025-02-25
Payer: COMMERCIAL

## 2025-02-25 RX ORDER — VILOXAZINE HYDROCHLORIDE 100 MG/1
100 CAPSULE, EXTENDED RELEASE ORAL DAILY
Qty: 90 CAPSULE | Refills: 1 | Status: SHIPPED | OUTPATIENT
Start: 2025-02-25

## 2025-03-18 ENCOUNTER — PATIENT MESSAGE (OUTPATIENT)
Dept: INTERNAL MEDICINE | Facility: CLINIC | Age: 45
End: 2025-03-18
Payer: COMMERCIAL

## 2025-04-06 DIAGNOSIS — N95.8 ARTIFICIAL MENOPAUSE STATE: ICD-10-CM

## 2025-04-07 RX ORDER — ESTRADIOL 10 UG/1
1 TABLET, FILM COATED VAGINAL 2 TIMES WEEKLY
Qty: 24 TABLET | Refills: 4 | Status: SHIPPED | OUTPATIENT
Start: 2025-04-07

## 2025-06-16 RX ORDER — VALACYCLOVIR HYDROCHLORIDE 500 MG/1
500 TABLET, FILM COATED ORAL 2 TIMES DAILY
Qty: 90 TABLET | Refills: 1 | Status: SHIPPED | OUTPATIENT
Start: 2025-06-16

## 2025-07-07 ENCOUNTER — PATIENT MESSAGE (OUTPATIENT)
Dept: INTERNAL MEDICINE | Facility: CLINIC | Age: 45
End: 2025-07-07
Payer: COMMERCIAL

## 2025-07-07 DIAGNOSIS — F41.9 ANXIETY: ICD-10-CM

## 2025-07-07 DIAGNOSIS — Z13.220 SCREENING, LIPID: Primary | ICD-10-CM

## 2025-07-14 DIAGNOSIS — N95.8 ARTIFICIAL MENOPAUSE STATE: ICD-10-CM

## 2025-07-14 NOTE — TELEPHONE ENCOUNTER
Estrogen and Estroge Combinations Protocol [id number]/14/2025 10:10 AM   Protocol Details Up to date pap smear in Health Maintenance

## 2025-07-25 ENCOUNTER — LAB (OUTPATIENT)
Dept: LAB | Facility: HOSPITAL | Age: 45
End: 2025-07-25
Payer: COMMERCIAL

## 2025-07-25 DIAGNOSIS — F41.9 ANXIETY: ICD-10-CM

## 2025-07-25 LAB
ALBUMIN SERPL-MCNC: 4.3 G/DL (ref 3.5–5.2)
ALBUMIN/GLOB SERPL: 1.3 G/DL
ALP SERPL-CCNC: 102 U/L (ref 39–117)
ALT SERPL W P-5'-P-CCNC: 6 U/L (ref 1–33)
ANION GAP SERPL CALCULATED.3IONS-SCNC: 10.6 MMOL/L (ref 5–15)
AST SERPL-CCNC: 26 U/L (ref 1–32)
BASOPHILS # BLD AUTO: 0.07 10*3/MM3 (ref 0–0.2)
BASOPHILS NFR BLD AUTO: 1.2 % (ref 0–1.5)
BILIRUB SERPL-MCNC: 0.6 MG/DL (ref 0–1.2)
BUN SERPL-MCNC: 12 MG/DL (ref 6–20)
BUN/CREAT SERPL: 19.4 (ref 7–25)
CALCIUM SPEC-SCNC: 9.1 MG/DL (ref 8.6–10.5)
CHLORIDE SERPL-SCNC: 100 MMOL/L (ref 98–107)
CHOLEST SERPL-MCNC: 173 MG/DL (ref 0–200)
CO2 SERPL-SCNC: 24.4 MMOL/L (ref 22–29)
CREAT SERPL-MCNC: 0.62 MG/DL (ref 0.57–1)
DEPRECATED RDW RBC AUTO: 43.8 FL (ref 37–54)
EGFRCR SERPLBLD CKD-EPI 2021: 110.7 ML/MIN/1.73
EOSINOPHIL # BLD AUTO: 0.11 10*3/MM3 (ref 0–0.4)
EOSINOPHIL NFR BLD AUTO: 1.9 % (ref 0.3–6.2)
ERYTHROCYTE [DISTWIDTH] IN BLOOD BY AUTOMATED COUNT: 12.9 % (ref 12.3–15.4)
GLOBULIN UR ELPH-MCNC: 3.4 GM/DL
GLUCOSE SERPL-MCNC: 98 MG/DL (ref 65–99)
HCT VFR BLD AUTO: 40 % (ref 34–46.6)
HDLC SERPL-MCNC: 63 MG/DL (ref 40–60)
HGB BLD-MCNC: 13.7 G/DL (ref 12–15.9)
IMM GRANULOCYTES # BLD AUTO: 0.01 10*3/MM3 (ref 0–0.05)
IMM GRANULOCYTES NFR BLD AUTO: 0.2 % (ref 0–0.5)
LDLC SERPL CALC-MCNC: 90 MG/DL (ref 0–100)
LDLC/HDLC SERPL: 1.39 {RATIO}
LYMPHOCYTES # BLD AUTO: 1.56 10*3/MM3 (ref 0.7–3.1)
LYMPHOCYTES NFR BLD AUTO: 26.7 % (ref 19.6–45.3)
MCH RBC QN AUTO: 32.2 PG (ref 26.6–33)
MCHC RBC AUTO-ENTMCNC: 34.3 G/DL (ref 31.5–35.7)
MCV RBC AUTO: 94.1 FL (ref 79–97)
MONOCYTES # BLD AUTO: 0.39 10*3/MM3 (ref 0.1–0.9)
MONOCYTES NFR BLD AUTO: 6.7 % (ref 5–12)
NEUTROPHILS NFR BLD AUTO: 3.71 10*3/MM3 (ref 1.7–7)
NEUTROPHILS NFR BLD AUTO: 63.3 % (ref 42.7–76)
NRBC BLD AUTO-RTO: 0 /100 WBC (ref 0–0.2)
PLATELET # BLD AUTO: 286 10*3/MM3 (ref 140–450)
PMV BLD AUTO: 10.7 FL (ref 6–12)
POTASSIUM SERPL-SCNC: 4.2 MMOL/L (ref 3.5–5.2)
PROT SERPL-MCNC: 7.7 G/DL (ref 6–8.5)
RBC # BLD AUTO: 4.25 10*6/MM3 (ref 3.77–5.28)
SODIUM SERPL-SCNC: 135 MMOL/L (ref 136–145)
TRIGL SERPL-MCNC: 111 MG/DL (ref 0–150)
TSH SERPL DL<=0.05 MIU/L-ACNC: 2.06 UIU/ML (ref 0.27–4.2)
VLDLC SERPL-MCNC: 20 MG/DL (ref 5–40)
WBC NRBC COR # BLD AUTO: 5.85 10*3/MM3 (ref 3.4–10.8)

## 2025-07-25 PROCEDURE — 80050 GENERAL HEALTH PANEL: CPT | Performed by: INTERNAL MEDICINE

## 2025-07-25 PROCEDURE — 80061 LIPID PANEL: CPT | Performed by: INTERNAL MEDICINE

## 2025-07-30 ENCOUNTER — OFFICE VISIT (OUTPATIENT)
Dept: INTERNAL MEDICINE | Facility: CLINIC | Age: 45
End: 2025-07-30
Payer: COMMERCIAL

## 2025-07-30 VITALS
HEIGHT: 65 IN | RESPIRATION RATE: 20 BRPM | BODY MASS INDEX: 23.43 KG/M2 | HEART RATE: 82 BPM | WEIGHT: 140.6 LBS | TEMPERATURE: 97.8 F | DIASTOLIC BLOOD PRESSURE: 82 MMHG | OXYGEN SATURATION: 98 % | SYSTOLIC BLOOD PRESSURE: 118 MMHG

## 2025-07-30 DIAGNOSIS — Z79.899 MEDICATION MANAGEMENT: ICD-10-CM

## 2025-07-30 DIAGNOSIS — F90.2 ATTENTION DEFICIT HYPERACTIVITY DISORDER (ADHD), COMBINED TYPE: Primary | ICD-10-CM

## 2025-07-30 LAB
AMPHET+METHAMPHET UR QL: NEGATIVE
AMPHETAMINE INTERNAL CONTROL: NORMAL
AMPHETAMINES UR QL: NEGATIVE
BARBITURATE INTERNAL CONTROL: NORMAL
BARBITURATES UR QL SCN: NEGATIVE
BENZODIAZ UR QL SCN: NEGATIVE
BENZODIAZEPINE INTERNAL CONTROL: NORMAL
BUPRENORPHINE INTERNAL CONTROL: NORMAL
BUPRENORPHINE SERPL-MCNC: NEGATIVE NG/ML
CANNABINOIDS SERPL QL: NEGATIVE
COCAINE INTERNAL CONTROL: NORMAL
COCAINE UR QL: NEGATIVE
EXPIRATION DATE: NORMAL
Lab: NORMAL
MDMA (ECSTASY) INTERNAL CONTROL: NORMAL
MDMA UR QL SCN: NEGATIVE
METHADONE INTERNAL CONTROL: NORMAL
METHADONE UR QL SCN: NEGATIVE
METHAMPHETAMINE INTERNAL CONTROL: NORMAL
MORPHINE INTERNAL CONTROL: NORMAL
MORPHINE/OPIATES SCREEN, URINE: NEGATIVE
OXYCODONE INTERNAL CONTROL: NORMAL
OXYCODONE UR QL SCN: NEGATIVE
PCP UR QL SCN: NEGATIVE
PHENCYCLIDINE INTERNAL CONTROL: NORMAL
THC INTERNAL CONTROL: NORMAL

## 2025-07-30 PROCEDURE — 80305 DRUG TEST PRSMV DIR OPT OBS: CPT | Performed by: INTERNAL MEDICINE

## 2025-07-30 PROCEDURE — 99214 OFFICE O/P EST MOD 30 MIN: CPT | Performed by: INTERNAL MEDICINE

## 2025-07-30 NOTE — PROGRESS NOTES
"Chief Complaint  Anxiety (6 mo f/u )      Subjective      History of Present Illness  The patient presents for ADHD management, incontinence, and estrogen therapy evaluation.    Strattera has been beneficial for ADHD symptoms, though it causes nausea and dizziness. She manages nausea by taking medications after breakfast, resulting in queasiness for about 6 hours. Over the weekend, she experienced severe dizziness, fell, and bumped her head without losing consciousness or memory. Blood pressure is normal except under stress or exertion. Qelbree worsened symptoms, and Wellbutrin caused anxiety and agitation despite helping depression. She is considering stimulants but is concerned about side effects. She sleeps 6.5 to 7 hours per night, though struggles with sleep during summer heat.    Estrogen therapy is effective. Dosage was reduced due to slightly elevated levels, resulting in improvement.    Incontinence issues have resolved with pelvic floor exercises.    Sleep: She sleeps well, averaging 6.5 to 7 hours per night.    FAMILY HISTORY  - Grandmother had heart failure and arrhythmias         Objective   Vital Signs:   Vitals:    07/30/25 1414   BP: 118/82   BP Location: Left arm   Patient Position: Sitting   Cuff Size: Adult   Pulse: 82   Resp: 20   Temp: 97.8 °F (36.6 °C)   TempSrc: Temporal   SpO2: 98%   Weight: 63.8 kg (140 lb 9.6 oz)   Height: 165.1 cm (65\")     Body mass index is 23.4 kg/m².    Wt Readings from Last 3 Encounters:   07/30/25 63.8 kg (140 lb 9.6 oz)   10/16/24 67.2 kg (148 lb 3.2 oz)   08/14/24 66.7 kg (147 lb)     BP Readings from Last 3 Encounters:   07/30/25 118/82   10/16/24 124/70   08/14/24 131/81       Health Maintenance   Topic Date Due    ANNUAL PHYSICAL  12/20/2024    INFLUENZA VACCINE  10/01/2025    MAMMOGRAM  02/12/2027    TDAP/TD VACCINES (2 - Td or Tdap) 01/30/2028    COLORECTAL CANCER SCREENING  06/14/2034    HEPATITIS C SCREENING  Completed    COVID-19 Vaccine  Completed    " Pneumococcal Vaccine 0-49  Aged Out       Physical Exam  Vitals reviewed.   Constitutional:       Appearance: Normal appearance. She is well-developed.   HENT:      Head: Normocephalic and atraumatic.      Right Ear: External ear normal.      Left Ear: External ear normal.   Eyes:      Conjunctiva/sclera: Conjunctivae normal.      Pupils: Pupils are equal, round, and reactive to light.   Cardiovascular:      Rate and Rhythm: Normal rate and regular rhythm.      Heart sounds: No murmur heard.     No friction rub. No gallop.   Pulmonary:      Effort: Pulmonary effort is normal.      Breath sounds: Normal breath sounds. No wheezing or rhonchi.   Skin:     General: Skin is warm and dry.   Neurological:      Mental Status: She is alert and oriented to person, place, and time.   Psychiatric:         Mood and Affect: Affect normal.         Behavior: Behavior normal.         Thought Content: Thought content normal.        Physical Exam        Result Review :  The following data was reviewed by: Radha Finnegan MD on 07/30/2025:         Results  Reviewed last labs    BMI is within normal parameters. No other follow-up for BMI required.       Procedures            Assessment & Plan  Attention deficit hyperactivity disorder (ADHD), combined type      Orders:    methylphenidate (Concerta) 18 MG CR tablet; Take 1 tablet by mouth Every Morning    Medication management    Orders:    POC Medline 12 Panel Urine Drug Screen         Assessment & Plan  ADHD  - Strattera helps but causes nausea and dizziness  - Qelbree was ineffective, Wellbutrin caused anxiety and agitation  - Trial of Concerta 18 mg once daily recommended  - Discussed potential side effects  - Monitor for cardiovascular side effects due to family history  - If Concerta is not tolerated, consider Wellbutrin 150 mg extended release once daily  - Follow-up every 3 months for monitoring and random urine drug screens    Incontinence  - Significant improvement with  pelvic floor exercises  - No current issues    Estrogen management  - Improved after reducing dose  - No recent issues    Follow-up  - Every 3 months for ADHD monitoring  - N/A for other conditions          FOLLOW UP  Return in about 3 months (around 10/30/2025).  Patient was given instructions and counseling regarding her condition or for health maintenance advice. Please see specific information pulled into the AVS if appropriate.     Radha Finnegan MD  08/04/25  00:00 EDT    CURRENT & DISCONTINUED MEDICATIONS  Current Outpatient Medications   Medication Instructions    Calcium Carbonate 1500 (600 Ca) MG tablet Calcium 600 600 mg calcium (1,500 mg) oral tablet take 2 tablets by oral route daily   Active    estradiol (VAGIFEM) 10 mcg, Vaginal, 2 Times Weekly    methylphenidate (CONCERTA) 18 mg, Oral, Every Morning    multivitamin (THERAGRAN) tablet tablet Daily    Premarin 0.625 mg, Oral, Daily    valACYclovir (VALTREX) 500 mg, Oral, 2 Times Daily       Medications Discontinued During This Encounter   Medication Reason    atomoxetine (Strattera) 25 MG capsule

## 2025-08-03 RX ORDER — METHYLPHENIDATE HYDROCHLORIDE 18 MG/1
18 TABLET ORAL EVERY MORNING
Qty: 30 TABLET | Refills: 0 | Status: SHIPPED | OUTPATIENT
Start: 2025-08-03

## (undated) DEVICE — Device: Brand: DEFENDO AIR/WATER/SUCTION AND BIOPSY VALVE

## (undated) DEVICE — Device

## (undated) DEVICE — SOLIDIFIER LIQLOC PLS 1500CC BT

## (undated) DEVICE — SOL IRRG H2O PL/BG 1000ML STRL

## (undated) DEVICE — LINER SURG CANSTR SXN S/RIGD 1500CC

## (undated) DEVICE — CONN JET HYDRA H20 AUXILIARY DISP